# Patient Record
Sex: MALE | Race: WHITE | NOT HISPANIC OR LATINO
[De-identification: names, ages, dates, MRNs, and addresses within clinical notes are randomized per-mention and may not be internally consistent; named-entity substitution may affect disease eponyms.]

---

## 2017-03-08 ENCOUNTER — APPOINTMENT (OUTPATIENT)
Dept: PULMONOLOGY | Facility: CLINIC | Age: 72
End: 2017-03-08

## 2017-03-08 VITALS
DIASTOLIC BLOOD PRESSURE: 64 MMHG | WEIGHT: 238 LBS | BODY MASS INDEX: 35.15 KG/M2 | HEART RATE: 68 BPM | SYSTOLIC BLOOD PRESSURE: 110 MMHG | OXYGEN SATURATION: 96 %

## 2017-03-08 DIAGNOSIS — F51.12 INSUFFICIENT SLEEP SYNDROME: ICD-10-CM

## 2017-04-11 ENCOUNTER — APPOINTMENT (OUTPATIENT)
Dept: PULMONOLOGY | Facility: CLINIC | Age: 72
End: 2017-04-11

## 2018-02-08 ENCOUNTER — MOBILE ON CALL (OUTPATIENT)
Age: 73
End: 2018-02-08

## 2018-02-16 ENCOUNTER — RX RENEWAL (OUTPATIENT)
Age: 73
End: 2018-02-16

## 2018-03-16 ENCOUNTER — APPOINTMENT (OUTPATIENT)
Dept: CARDIOLOGY | Facility: CLINIC | Age: 73
End: 2018-03-16

## 2018-04-02 ENCOUNTER — APPOINTMENT (OUTPATIENT)
Dept: CARDIOLOGY | Facility: CLINIC | Age: 73
End: 2018-04-02

## 2018-04-16 ENCOUNTER — APPOINTMENT (OUTPATIENT)
Dept: CARDIOLOGY | Facility: CLINIC | Age: 73
End: 2018-04-16

## 2018-04-18 ENCOUNTER — APPOINTMENT (OUTPATIENT)
Dept: CARDIOLOGY | Facility: CLINIC | Age: 73
End: 2018-04-18

## 2018-05-15 ENCOUNTER — RECORD ABSTRACTING (OUTPATIENT)
Age: 73
End: 2018-05-15

## 2018-05-15 DIAGNOSIS — K57.90 DIVERTICULOSIS OF INTESTINE, PART UNSPECIFIED, W/OUT PERFORATION OR ABSCESS W/OUT BLEEDING: ICD-10-CM

## 2018-05-15 DIAGNOSIS — G45.9 TRANSIENT CEREBRAL ISCHEMIC ATTACK, UNSPECIFIED: ICD-10-CM

## 2018-05-16 ENCOUNTER — APPOINTMENT (OUTPATIENT)
Dept: CARDIOLOGY | Facility: CLINIC | Age: 73
End: 2018-05-16
Payer: MEDICARE

## 2018-05-16 VITALS
SYSTOLIC BLOOD PRESSURE: 102 MMHG | HEART RATE: 67 BPM | BODY MASS INDEX: 30.48 KG/M2 | WEIGHT: 225 LBS | DIASTOLIC BLOOD PRESSURE: 70 MMHG | HEIGHT: 72 IN | RESPIRATION RATE: 16 BRPM

## 2018-05-16 PROCEDURE — 99214 OFFICE O/P EST MOD 30 MIN: CPT

## 2018-05-16 PROCEDURE — 93000 ELECTROCARDIOGRAM COMPLETE: CPT

## 2018-05-23 ENCOUNTER — RX RENEWAL (OUTPATIENT)
Age: 73
End: 2018-05-23

## 2018-05-25 ENCOUNTER — RX RENEWAL (OUTPATIENT)
Age: 73
End: 2018-05-25

## 2018-08-20 PROBLEM — G45.9 TRANSIENT ISCHEMIC ATTACK: Status: ACTIVE | Noted: 2018-05-15

## 2018-09-10 ENCOUNTER — RX RENEWAL (OUTPATIENT)
Age: 73
End: 2018-09-10

## 2018-11-12 ENCOUNTER — APPOINTMENT (OUTPATIENT)
Dept: CARDIOLOGY | Facility: CLINIC | Age: 73
End: 2018-11-12
Payer: MEDICARE

## 2018-11-12 VITALS
OXYGEN SATURATION: 95 % | WEIGHT: 235 LBS | HEART RATE: 67 BPM | RESPIRATION RATE: 15 BRPM | SYSTOLIC BLOOD PRESSURE: 99 MMHG | DIASTOLIC BLOOD PRESSURE: 64 MMHG | HEIGHT: 72 IN | BODY MASS INDEX: 31.83 KG/M2

## 2018-11-12 PROCEDURE — 93000 ELECTROCARDIOGRAM COMPLETE: CPT

## 2018-11-12 PROCEDURE — 99214 OFFICE O/P EST MOD 30 MIN: CPT

## 2018-11-12 NOTE — ASSESSMENT
[FreeTextEntry1] : ECG: SR, lefwtard axis\par ECHO 2017: Sigmoid septum, EF 50-55%, normal RV, normal LA/RA, mild AI, mild MAC\par PHARMACOLOGIC NUCLEAR STRESS TEST 2017: Negative for ischemia, EF 64%\par CAROTID DUPLEX 2016: Minimal plaque bilaterally, antegrade flow in vertebral arteries\par CARDIAC CATH 2013: 50% LAD stenosis, CALCIUM SCORE 100 in 2013\par AORTIC DUPLEX 2013: Negative for aneurysm\par \par

## 2018-11-12 NOTE — DISCUSSION/SUMMARY
[FreeTextEntry1] : Patient is a 71yo M with PAF, TIA, non-obstructive CAD, IRAM, AI. Patient has been doing well without any chest pain or shortness of breath. \par \par 1. Continue medical management of CAD\par 2. Continue anticoagulation for atrial fibrillation thromboembolic prophylaxis\par 3. Continue metoprolol at current dose, may need to cut back if BP drops lower and symptomatic\par 4. Recommend aggressive diet and lifestyle modifications \par 5. Recommend 30 minutes aerobic activity 4 to 5 days per week as tolerated\par 6. Follow up 6 months\par 7. Echo and nuclear stress test in 6 months for surveillance of CAD\par 8. Carotid in 6 months for surveillance as well\par 9. Repeat lipids with PMD in coming weeks

## 2018-11-12 NOTE — PHYSICAL EXAM
[General Appearance - Well Developed] : well developed [Normal Appearance] : normal appearance [Well Groomed] : well groomed [General Appearance - Well Nourished] : well nourished [No Deformities] : no deformities [General Appearance - In No Acute Distress] : no acute distress [Normal Conjunctiva] : the conjunctiva exhibited no abnormalities [Eyelids - No Xanthelasma] : the eyelids demonstrated no xanthelasmas [Normal Oral Mucosa] : normal oral mucosa [No Oral Pallor] : no oral pallor [No Oral Cyanosis] : no oral cyanosis [Normal Jugular Venous A Waves Present] : normal jugular venous A waves present [Normal Jugular Venous V Waves Present] : normal jugular venous V waves present [No Jugular Venous Diop A Waves] : no jugular venous diop A waves [Respiration, Rhythm And Depth] : normal respiratory rhythm and effort [Exaggerated Use Of Accessory Muscles For Inspiration] : no accessory muscle use [Auscultation Breath Sounds / Voice Sounds] : lungs were clear to auscultation bilaterally [Heart Rate And Rhythm] : heart rate and rhythm were normal [Heart Sounds] : normal S1 and S2 [Murmurs] : no murmurs present [Abdomen Soft] : soft [Abdomen Tenderness] : non-tender [Abdomen Mass (___ Cm)] : no abdominal mass palpated [Abnormal Walk] : normal gait [Nail Clubbing] : no clubbing of the fingernails [Cyanosis, Localized] : no localized cyanosis [Petechial Hemorrhages (___cm)] : no petechial hemorrhages [Skin Color & Pigmentation] : normal skin color and pigmentation [] : no rash [No Venous Stasis] : no venous stasis [Skin Lesions] : no skin lesions [No Skin Ulcers] : no skin ulcer [No Xanthoma] : no  xanthoma was observed [Oriented To Time, Place, And Person] : oriented to person, place, and time [Affect] : the affect was normal [Mood] : the mood was normal [No Anxiety] : not feeling anxious [FreeTextEntry1] : obese

## 2018-11-12 NOTE — HISTORY OF PRESENT ILLNESS
[FreeTextEntry1] : Patient is a 73yo M with PAF, TIA, non-obstructive CAD, IRAM, AI here for cardiac follow up. Doing well without exertional CP/SOB. No palps/dizziness/syncope. No new complaints since prior visit. A bit tired lately and took a few weeks to recover from bronchitis. Goes to bed late and up early which has been chronic for him. \par \par ROS: GI and  negative

## 2019-02-11 ENCOUNTER — RX RENEWAL (OUTPATIENT)
Age: 74
End: 2019-02-11

## 2019-03-11 ENCOUNTER — RX RENEWAL (OUTPATIENT)
Age: 74
End: 2019-03-11

## 2019-04-12 ENCOUNTER — APPOINTMENT (OUTPATIENT)
Dept: CARDIOLOGY | Facility: CLINIC | Age: 74
End: 2019-04-12
Payer: MEDICARE

## 2019-04-12 PROCEDURE — 93880 EXTRACRANIAL BILAT STUDY: CPT

## 2019-04-12 PROCEDURE — 93306 TTE W/DOPPLER COMPLETE: CPT

## 2019-04-16 ENCOUNTER — APPOINTMENT (OUTPATIENT)
Dept: CARDIOLOGY | Facility: CLINIC | Age: 74
End: 2019-04-16
Payer: MEDICARE

## 2019-04-16 PROCEDURE — A9500: CPT

## 2019-04-16 PROCEDURE — 78452 HT MUSCLE IMAGE SPECT MULT: CPT

## 2019-04-16 PROCEDURE — 93015 CV STRESS TEST SUPVJ I&R: CPT

## 2019-04-16 RX ORDER — REGADENOSON 0.08 MG/ML
0.4 INJECTION, SOLUTION INTRAVENOUS
Qty: 1 | Refills: 0 | Status: COMPLETED | OUTPATIENT
Start: 2019-04-16

## 2019-04-16 RX ADMIN — REGADENOSON 5 MG/5ML: 0.08 INJECTION, SOLUTION INTRAVENOUS at 00:00

## 2019-04-22 ENCOUNTER — APPOINTMENT (OUTPATIENT)
Dept: CARDIOLOGY | Facility: CLINIC | Age: 74
End: 2019-04-22
Payer: MEDICARE

## 2019-04-22 PROCEDURE — ZZZZZ: CPT

## 2019-05-01 ENCOUNTER — NON-APPOINTMENT (OUTPATIENT)
Age: 74
End: 2019-05-01

## 2019-05-01 ENCOUNTER — APPOINTMENT (OUTPATIENT)
Dept: CARDIOLOGY | Facility: CLINIC | Age: 74
End: 2019-05-01
Payer: MEDICARE

## 2019-05-01 VITALS
BODY MASS INDEX: 31.15 KG/M2 | WEIGHT: 230 LBS | OXYGEN SATURATION: 94 % | SYSTOLIC BLOOD PRESSURE: 114 MMHG | RESPIRATION RATE: 14 BRPM | DIASTOLIC BLOOD PRESSURE: 70 MMHG | HEART RATE: 65 BPM | HEIGHT: 72 IN

## 2019-05-01 PROCEDURE — 93000 ELECTROCARDIOGRAM COMPLETE: CPT

## 2019-05-01 PROCEDURE — 99214 OFFICE O/P EST MOD 30 MIN: CPT

## 2019-05-01 NOTE — HISTORY OF PRESENT ILLNESS
[FreeTextEntry1] : Patient is a 71yo M with PAF, TIA, non-obstructive CAD, IRAM, AI here for cardiac follow up. Doing well without exertional CP/SOB. No palps/dizziness/syncope. No new complaints since prior visit. A bit tired lately. Otherwise no new complaints. Underwent cardiac testing after last visit. \par \par ROS: GI and  negative

## 2019-05-01 NOTE — PHYSICAL EXAM
[General Appearance - Well Developed] : well developed [Normal Appearance] : normal appearance [Well Groomed] : well groomed [General Appearance - Well Nourished] : well nourished [No Deformities] : no deformities [General Appearance - In No Acute Distress] : no acute distress [Normal Conjunctiva] : the conjunctiva exhibited no abnormalities [Eyelids - No Xanthelasma] : the eyelids demonstrated no xanthelasmas [Normal Oral Mucosa] : normal oral mucosa [No Oral Pallor] : no oral pallor [No Oral Cyanosis] : no oral cyanosis [Normal Jugular Venous A Waves Present] : normal jugular venous A waves present [Normal Jugular Venous V Waves Present] : normal jugular venous V waves present [No Jugular Venous Diop A Waves] : no jugular venous diop A waves [Respiration, Rhythm And Depth] : normal respiratory rhythm and effort [Exaggerated Use Of Accessory Muscles For Inspiration] : no accessory muscle use [Auscultation Breath Sounds / Voice Sounds] : lungs were clear to auscultation bilaterally [Heart Rate And Rhythm] : heart rate and rhythm were normal [Heart Sounds] : normal S1 and S2 [Murmurs] : no murmurs present [Abdomen Soft] : soft [Abdomen Tenderness] : non-tender [Abdomen Mass (___ Cm)] : no abdominal mass palpated [Nail Clubbing] : no clubbing of the fingernails [Abnormal Walk] : normal gait [Petechial Hemorrhages (___cm)] : no petechial hemorrhages [Cyanosis, Localized] : no localized cyanosis [Skin Color & Pigmentation] : normal skin color and pigmentation [] : no rash [No Venous Stasis] : no venous stasis [Skin Lesions] : no skin lesions [No Xanthoma] : no  xanthoma was observed [No Skin Ulcers] : no skin ulcer [Oriented To Time, Place, And Person] : oriented to person, place, and time [Affect] : the affect was normal [No Anxiety] : not feeling anxious [Mood] : the mood was normal [FreeTextEntry1] : obese

## 2019-05-01 NOTE — DISCUSSION/SUMMARY
[FreeTextEntry1] : Patient is a 73yo M with PAF, TIA, non-obstructive CAD, IRAM, AI. Patient has been doing well without any chest pain or shortness of breath. Strss test without ischemic, Echo shows normal biventricular function and no clinically significant valvular abnormalities. Mild AI needs surveillance only and unlikely to become significant. No significant carotid disease either. \par \par 1. Continue medical management of CAD, on statin. No need to add ASA as on Eliquis. Lipids at goal \par 2. Continue anticoagulation for atrial fibrillation thromboembolic prophylaxis\par 3. Continue current antihypertensives, blood pressure is well controlled \par 4. Recommend aggressive diet and lifestyle modifications \par 5. Recommend 30 minutes moderate intensity aerobic activity 5 days per week as tolerated \par 6. Follow up 6 months\par 7. ADvised to quit cigars, only smokes occasionally

## 2019-05-01 NOTE — ASSESSMENT
[FreeTextEntry1] : ECG: SR, leftward axis, 1st degree AV delay\par \par  HDL 51 TG 70 LDL 59 (12/2018)\par \par CAROTID 4/2019:\par 1. All arteries were clearly visualized.\par 2. No hemodynamically significant stenosis is noted in the left ICA.\par 3. No hemodynamically significant stenosis is noted in the right ICA.\par 4. There is antegrade flow in the right and left vertebral arteries.\par \par ECHO 4/2019:\par 1. Sigmoid septum without evidence of obstruction.\par 2. Normal left ventricular size and wall thickness, with normal systolic and diastolic function.\par 3. Left ventricular ejection fraction, by visual estimation, is 55 to 60%.\par 4. Normal right ventricular size and systolic function.\par 5. The left atrium is normal in size and structure.\par 6. The right atrium is normal in size and structure.\par 7. Mild thickening and calcification of the anterior and posterior mitral valve leaflets.\par 8. Mild to moderate posterior mitral annular calcification.\par 9. Mild aortic valve sclerosis without stenosis.\par 10. Mild aortic regurgitation.\par \par PHARM NUCLEAR STRESS TEST 4/2019:\par 1. Normal Sestamibi myocardial perfusion SPECT imaging.\par 2. Left ventricular function was normal with an EF of 61%.\par 3. The EKG was negative for ischemia.\par 4. The patient developed no dysrhythmias during stress.\par 5. The blood pressure response was normal.\par \par \par CARDIAC CATH 2013: 50% LAD stenosis, CALCIUM SCORE 100 in 2013\par AORTIC DUPLEX 2013: Negative for aneurysm\par \par

## 2019-05-03 RX ORDER — KIT FOR THE PREPARATION OF TECHNETIUM TC99M SESTAMIBI 1 MG/5ML
INJECTION, POWDER, LYOPHILIZED, FOR SOLUTION PARENTERAL
Refills: 0 | Status: COMPLETED | OUTPATIENT
Start: 2019-05-03

## 2019-05-03 RX ADMIN — KIT FOR THE PREPARATION OF TECHNETIUM TC99M SESTAMIBI 0: 1 INJECTION, POWDER, LYOPHILIZED, FOR SOLUTION PARENTERAL at 00:00

## 2019-05-20 ENCOUNTER — RX RENEWAL (OUTPATIENT)
Age: 74
End: 2019-05-20

## 2019-09-09 ENCOUNTER — RX RENEWAL (OUTPATIENT)
Age: 74
End: 2019-09-09

## 2019-10-11 ENCOUNTER — APPOINTMENT (OUTPATIENT)
Dept: CARDIOLOGY | Facility: CLINIC | Age: 74
End: 2019-10-11

## 2019-11-22 ENCOUNTER — RX RENEWAL (OUTPATIENT)
Age: 74
End: 2019-11-22

## 2019-12-05 ENCOUNTER — APPOINTMENT (OUTPATIENT)
Dept: CARDIOLOGY | Facility: CLINIC | Age: 74
End: 2019-12-05

## 2019-12-19 ENCOUNTER — APPOINTMENT (OUTPATIENT)
Dept: CARDIOLOGY | Facility: CLINIC | Age: 74
End: 2019-12-19
Payer: MEDICARE

## 2019-12-19 ENCOUNTER — NON-APPOINTMENT (OUTPATIENT)
Age: 74
End: 2019-12-19

## 2019-12-19 VITALS
WEIGHT: 226 LBS | BODY MASS INDEX: 30.61 KG/M2 | DIASTOLIC BLOOD PRESSURE: 72 MMHG | HEART RATE: 64 BPM | OXYGEN SATURATION: 99 % | HEIGHT: 72 IN | SYSTOLIC BLOOD PRESSURE: 108 MMHG

## 2019-12-19 PROCEDURE — 99214 OFFICE O/P EST MOD 30 MIN: CPT

## 2019-12-19 PROCEDURE — 93000 ELECTROCARDIOGRAM COMPLETE: CPT

## 2019-12-19 NOTE — HISTORY OF PRESENT ILLNESS
[FreeTextEntry1] : Patient is a 75yo M with PAF, TIA, non-obstructive CAD, IRAM, AI here for cardiac follow up. Doing well without exertional CP/SOB. No palps/dizziness/syncope. No new complaints since prior visit. Tolerating meds well. RAre fleeting CP lasts seconds. No associated symptoms with it. Tries to walk as much as possible and bowls. Chronic neck/back pain. \par \par ROS: GI and  negative

## 2019-12-19 NOTE — DISCUSSION/SUMMARY
[FreeTextEntry1] : Patient is a 75yo M with PAF, TIA, non-obstructive CAD, IRAM, AI. Patient has been doing well without any chest pain or shortness of breath. Stress test earlier this year without ischemic, Echo showed normal biventricular function and no clinically significant valvular abnormalities. Mild AI needs surveillance only and unlikely to become significant. No significant carotid disease either. Overall stable from CV standpoint\par ECG unchanged as is exam today. \par \par 1. Continue medical management of CAD, on statin. No need to add ASA as on Eliquis. Lipids at goal \par 2. Continue anticoagulation for atrial fibrillation thromboembolic prophylaxis\par 3. Continue current antihypertensives, blood pressure is well controlled \par 4. Recommend aggressive diet and lifestyle modifications \par 5. Recommend 30 minutes moderate intensity aerobic activity 5 days per week as tolerated \par 6. Follow up 1 year\par 7. ADvised to quit cigars, only smokes occasionally \par 8. Follow up PMD for back/neck pain and BW

## 2019-12-19 NOTE — ASSESSMENT
[FreeTextEntry1] : ECG: SR, LAFB \par \par  HDL 51 TG 70 LDL 59 (12/2018)\par \par CAROTID 4/2019:\par 1. All arteries were clearly visualized.\par 2. No hemodynamically significant stenosis is noted in the left ICA.\par 3. No hemodynamically significant stenosis is noted in the right ICA.\par 4. There is antegrade flow in the right and left vertebral arteries.\par \par ECHO 4/2019:\par 1. Sigmoid septum without evidence of obstruction.\par 2. Normal left ventricular size and wall thickness, with normal systolic and diastolic function.\par 3. Left ventricular ejection fraction, by visual estimation, is 55 to 60%.\par 4. Normal RV/LA/RA \par 5. Mild to moderate posterior mitral annular calcification.\par 6. Mild aortic regurgitation.\par \par PHARM NUCLEAR STRESS TEST 4/2019:\par 1. Normal Sestamibi myocardial perfusion SPECT imaging.\par 2. Left ventricular function was normal with an EF of 61%.\par 3. The EKG was negative for ischemia.\par 4. The patient developed no dysrhythmias during stress.\par 5. The blood pressure response was normal.\par \par \par CARDIAC CATH 2013: 50% LAD stenosis, CALCIUM SCORE 100 in 2013\par AORTIC DUPLEX 2013: Negative for aneurysm\par \par

## 2019-12-19 NOTE — PHYSICAL EXAM
[General Appearance - Well Developed] : well developed [General Appearance - In No Acute Distress] : no acute distress [General Appearance - Well Nourished] : well nourished [Normal Oral Mucosa] : normal oral mucosa [Normal Conjunctiva] : the conjunctiva exhibited no abnormalities [Normal Jugular Venous V Waves Present] : normal jugular venous V waves present [Respiration, Rhythm And Depth] : normal respiratory rhythm and effort [Heart Sounds] : normal S1 and S2 [Auscultation Breath Sounds / Voice Sounds] : lungs were clear to auscultation bilaterally [Heart Rate And Rhythm] : heart rate and rhythm were normal [Murmurs] : no murmurs present [Abdomen Soft] : soft [Abdomen Tenderness] : non-tender [] : no hepato-splenomegaly [Abdomen Mass (___ Cm)] : no abdominal mass palpated [Abnormal Walk] : normal gait [Nail Clubbing] : no clubbing of the fingernails [Cyanosis, Localized] : no localized cyanosis [Skin Color & Pigmentation] : normal skin color and pigmentation [Oriented To Time, Place, And Person] : oriented to person, place, and time [Affect] : the affect was normal [FreeTextEntry1] : no edema

## 2020-05-12 ENCOUNTER — RX RENEWAL (OUTPATIENT)
Age: 75
End: 2020-05-12

## 2020-05-20 ENCOUNTER — RX RENEWAL (OUTPATIENT)
Age: 75
End: 2020-05-20

## 2020-08-18 ENCOUNTER — NON-APPOINTMENT (OUTPATIENT)
Age: 75
End: 2020-08-18

## 2020-08-18 ENCOUNTER — APPOINTMENT (OUTPATIENT)
Dept: CARDIOLOGY | Facility: CLINIC | Age: 75
End: 2020-08-18
Payer: MEDICARE

## 2020-08-18 VITALS
HEART RATE: 78 BPM | DIASTOLIC BLOOD PRESSURE: 72 MMHG | OXYGEN SATURATION: 97 % | BODY MASS INDEX: 30.2 KG/M2 | TEMPERATURE: 97.3 F | SYSTOLIC BLOOD PRESSURE: 112 MMHG | WEIGHT: 223 LBS | RESPIRATION RATE: 16 BRPM | HEIGHT: 72 IN

## 2020-08-18 PROCEDURE — 93000 ELECTROCARDIOGRAM COMPLETE: CPT

## 2020-08-18 PROCEDURE — 99214 OFFICE O/P EST MOD 30 MIN: CPT

## 2020-08-18 NOTE — DISCUSSION/SUMMARY
[FreeTextEntry1] : Patient is a 74yo M with PAF, TIA, non-obstructive CAD, IRAM, AI. Patient has been doing well without any chest pain or shortness of breath. Stress test 2019 without ischemic, Echo showed normal biventricular function and no clinically significant valvular abnormalities. Mild AI needs surveillance only and unlikely to become significant. No significant carotid disease either. Overall stable from CV standpoint\par ECG unchanged as is exam today. \par \par 1. Continue medical management of CAD, on statin. No need to add ASA as on Eliquis. Lipids at goal \par 2. Continue anticoagulation for atrial fibrillation thromboembolic prophylaxis\par 3. Continue current antihypertensives, blood pressure is well controlled \par 4. Recommend aggressive diet and lifestyle modifications \par 5. Recommend 30 minutes moderate intensity aerobic activity 5 days per week as tolerated \par 6. Patient is at low cardiovascular risk for low risk procedures (EGD/colonoscopy). Hold Eliquis 48 hours prior and restart post-procedurally. \par 7. Follow up 6 months

## 2020-08-18 NOTE — PHYSICAL EXAM
[General Appearance - Well Developed] : well developed [General Appearance - Well Nourished] : well nourished [General Appearance - In No Acute Distress] : no acute distress [Normal Conjunctiva] : the conjunctiva exhibited no abnormalities [Normal Oral Mucosa] : normal oral mucosa [Normal Jugular Venous V Waves Present] : normal jugular venous V waves present [Auscultation Breath Sounds / Voice Sounds] : lungs were clear to auscultation bilaterally [Respiration, Rhythm And Depth] : normal respiratory rhythm and effort [Heart Sounds] : normal S1 and S2 [Heart Rate And Rhythm] : heart rate and rhythm were normal [Murmurs] : no murmurs present [Abdomen Soft] : soft [] : no hepato-splenomegaly [Abdomen Mass (___ Cm)] : no abdominal mass palpated [Abdomen Tenderness] : non-tender [Abnormal Walk] : normal gait [Cyanosis, Localized] : no localized cyanosis [Nail Clubbing] : no clubbing of the fingernails [Skin Color & Pigmentation] : normal skin color and pigmentation [Oriented To Time, Place, And Person] : oriented to person, place, and time [Affect] : the affect was normal [FreeTextEntry1] : no edema

## 2020-08-18 NOTE — ASSESSMENT
[FreeTextEntry1] : ECG: SR, LAFB , APCs\par \par  HDL 72 LDL 69 TG 60 (6/2020) \par  HDL 51 LDL 59 TG 70 (12/2018)\par \par CAROTID 4/2019:\par 1. All arteries were clearly visualized.\par 2. No hemodynamically significant stenosis is noted in the left ICA.\par 3. No hemodynamically significant stenosis is noted in the right ICA.\par 4. There is antegrade flow in the right and left vertebral arteries.\par \par ECHO 4/2019:\par 1. Sigmoid septum without evidence of obstruction.\par 2. Normal left ventricular size and wall thickness, with normal systolic and diastolic function.\par 3. Left ventricular ejection fraction, by visual estimation, is 55 to 60%.\par 4. Normal RV/LA/RA \par 5. Mild to moderate posterior mitral annular calcification.\par 6. Mild aortic regurgitation.\par \par PHARM NUCLEAR STRESS TEST 4/2019:\par 1. Normal Sestamibi myocardial perfusion SPECT imaging.\par 2. Left ventricular function was normal with an EF of 61%.\par 3. The EKG was negative for ischemia.\par 4. The patient developed no dysrhythmias during stress.\par 5. The blood pressure response was normal.\par \par \par CARDIAC CATH 2013: 50% LAD stenosis, CALCIUM SCORE 100 in 2013\par AORTIC DUPLEX 2013: Negative for aneurysm\par \par

## 2020-08-18 NOTE — HISTORY OF PRESENT ILLNESS
[FreeTextEntry1] : Patient is a 74yo M with PAF, TIA, non-obstructive CAD, IRAM, AI here for cardiac follow up. Doing well without exertional CP/SOB. No palps/dizziness/syncope. No new complaints since prior visit. Tolerating meds well. Been very careful during pandemic, not going out much. Walks aroundhouse and up and down stairs without symptoms. HAs intermittent sharp left sided chest pains that resolves quickly. HAs had ove years in past intermittently. NO change. In need of EGD/colonoscopy. STill with chronic neck pain. \par \par ROS: GI and  negative

## 2020-11-09 ENCOUNTER — RX RENEWAL (OUTPATIENT)
Age: 75
End: 2020-11-09

## 2021-04-13 ENCOUNTER — NON-APPOINTMENT (OUTPATIENT)
Age: 76
End: 2021-04-13

## 2021-04-13 ENCOUNTER — APPOINTMENT (OUTPATIENT)
Dept: CARDIOLOGY | Facility: CLINIC | Age: 76
End: 2021-04-13
Payer: MEDICARE

## 2021-04-13 VITALS
DIASTOLIC BLOOD PRESSURE: 73 MMHG | SYSTOLIC BLOOD PRESSURE: 114 MMHG | BODY MASS INDEX: 30.34 KG/M2 | HEART RATE: 73 BPM | WEIGHT: 224 LBS | RESPIRATION RATE: 16 BRPM | HEIGHT: 72 IN | OXYGEN SATURATION: 97 %

## 2021-04-13 PROCEDURE — 99214 OFFICE O/P EST MOD 30 MIN: CPT

## 2021-04-13 PROCEDURE — 93000 ELECTROCARDIOGRAM COMPLETE: CPT

## 2021-04-13 NOTE — DISCUSSION/SUMMARY
[FreeTextEntry1] : Patient is a 74yo M with PAF, TIA, non-obstructive CAD, IRAM, AI.\par -Stress test 2019 without ischemic, Echo showed normal biventricular function. Mild AI needs surveillance -Minimal plaque on carotid duplex in 2019\par -ECG with 1st degree delay, otherwise unchanged. No indication for further eval at this time\par \par 1. Continue medical management of CAD, on statin. No need to add ASA as on Eliquis.Lipids at goal 6/2020\par 2. Continue anticoagulation for atrial fibrillation thromboembolic prophylaxis\par 3. Continue current antihypertensives, blood pressure is well controlled\par 4. Recommend aggressive diet and lifestyle modifications , counselled on weight loss \par 5. Follow up 6 months

## 2021-04-13 NOTE — HISTORY OF PRESENT ILLNESS
[FreeTextEntry1] : Patient is a 74yo M with PAF, TIA, non-obstructive CAD, IRAM, AI here for cardiac follow up. Doing well without exertional CP/SOB. No palps/dizziness/syncope. REmains sedentary since pandemic. Got both doses COVID vaccine in 2/2021. RArely will feel a bit tired. Doesnt sleep much which is chronic. No new complaints.. Able to go up stairs without symptoms. \par \par ROS: GI and  negative

## 2021-04-13 NOTE — PHYSICAL EXAM
[General Appearance - Well Developed] : well developed [General Appearance - Well Nourished] : well nourished [General Appearance - In No Acute Distress] : no acute distress [Normal Conjunctiva] : the conjunctiva exhibited no abnormalities [Normal Oral Mucosa] : normal oral mucosa [Normal Jugular Venous V Waves Present] : normal jugular venous V waves present [Respiration, Rhythm And Depth] : normal respiratory rhythm and effort [Auscultation Breath Sounds / Voice Sounds] : lungs were clear to auscultation bilaterally [Heart Rate And Rhythm] : heart rate and rhythm were normal [Heart Sounds] : normal S1 and S2 [Murmurs] : no murmurs present [Abdomen Soft] : soft [Abdomen Tenderness] : non-tender [] : no hepato-splenomegaly [Abdomen Mass (___ Cm)] : no abdominal mass palpated [Abnormal Walk] : normal gait [Nail Clubbing] : no clubbing of the fingernails [Cyanosis, Localized] : no localized cyanosis [Skin Color & Pigmentation] : normal skin color and pigmentation [Oriented To Time, Place, And Person] : oriented to person, place, and time [Affect] : the affect was normal [FreeTextEntry1] : no edema

## 2021-04-13 NOTE — ASSESSMENT
[FreeTextEntry1] : ECG: SR, 1st degree AV delay, low voltage\par \par  HDL 72 LDL 69 TG 60 (6/2020) \par  HDL 51 LDL 59 TG 70 (12/2018)\par \par CAROTID 4/2019:\par 1. All arteries were clearly visualized.\par 2. No hemodynamically significant stenosis is noted in the left ICA.\par 3. No hemodynamically significant stenosis is noted in the right ICA.\par 4. There is antegrade flow in the right and left vertebral arteries.\par \par ECHO 4/2019:\par 1. Sigmoid septum without evidence of obstruction.\par 2. Normal left ventricular size and wall thickness, with normal systolic and diastolic function.\par 3. Left ventricular ejection fraction, by visual estimation, is 55 to 60%.\par 4. Normal RV/LA/RA \par 5. Mild to moderate posterior mitral annular calcification.\par 6. Mild aortic regurgitation.\par \par PHARM NUCLEAR STRESS TEST 4/2019:\par 1. Normal Sestamibi myocardial perfusion SPECT imaging.\par 2. Left ventricular function was normal with an EF of 61%.\par 3. The EKG was negative for ischemia.\par 4. The patient developed no dysrhythmias during stress.\par 5. The blood pressure response was normal.\par \par \par CARDIAC CATH 2013: 50% LAD stenosis, CALCIUM SCORE 100 in 2013\par AORTIC DUPLEX 2013: Negative for aneurysm\par \par

## 2021-06-16 ENCOUNTER — RX RENEWAL (OUTPATIENT)
Age: 76
End: 2021-06-16

## 2021-10-12 ENCOUNTER — APPOINTMENT (OUTPATIENT)
Dept: CARDIOLOGY | Facility: CLINIC | Age: 76
End: 2021-10-12

## 2021-10-13 ENCOUNTER — NON-APPOINTMENT (OUTPATIENT)
Age: 76
End: 2021-10-13

## 2021-10-13 ENCOUNTER — APPOINTMENT (OUTPATIENT)
Dept: CARDIOLOGY | Facility: CLINIC | Age: 76
End: 2021-10-13
Payer: MEDICARE

## 2021-10-13 VITALS
SYSTOLIC BLOOD PRESSURE: 92 MMHG | DIASTOLIC BLOOD PRESSURE: 63 MMHG | BODY MASS INDEX: 28.85 KG/M2 | HEART RATE: 74 BPM | RESPIRATION RATE: 16 BRPM | HEIGHT: 72 IN | WEIGHT: 213 LBS

## 2021-10-13 PROCEDURE — 99214 OFFICE O/P EST MOD 30 MIN: CPT

## 2021-10-13 PROCEDURE — 93000 ELECTROCARDIOGRAM COMPLETE: CPT

## 2021-10-13 RX ORDER — AMOXICILLIN 500 MG/1
500 CAPSULE ORAL
Qty: 4 | Refills: 0 | Status: COMPLETED | COMMUNITY
Start: 2021-07-22

## 2021-10-13 RX ORDER — DOXAZOSIN 8 MG/1
8 TABLET ORAL
Qty: 90 | Refills: 0 | Status: COMPLETED | COMMUNITY
Start: 2021-07-01

## 2021-10-13 RX ORDER — BUPROPION HYDROCHLORIDE 150 MG/1
150 TABLET, EXTENDED RELEASE ORAL
Qty: 90 | Refills: 0 | Status: COMPLETED | COMMUNITY
Start: 2021-08-09

## 2021-10-13 NOTE — DISCUSSION/SUMMARY
[FreeTextEntry1] : Patient is a 75yo M with PAF, TIA, non-obstructive CAD, IRAM, AI here for follow up \par -Stress test 2019 without ischemic, Echo showed normal biventricular function. Mild AI needs surveillance -Minimal plaque on carotid duplex in 2019\par -ECG shows patient now in persistent AF, no clear symptoms. BP a bit low but asx. Will arrange holter and echo, as long as rate controller/no new symptoms and no change in echo then follow up 6 mnths with rate control strategy\par \par 1. Continue medical management of CAD, on statin. No need to add ASA as on Eliquis.Lipids at goal 6/2020\par 2. Continue anticoagulation for atrial fibrillation thromboembolic prophylaxis and rate control strategy\par 3. ECho and holter, call with results as long as stable findings \par 4. Recommend aggressive diet and lifestyle modifications , counselled on weight loss \par 5. Follow up 6 months

## 2021-10-13 NOTE — HISTORY OF PRESENT ILLNESS
[FreeTextEntry1] : Patient is a 77yo M with PAF, TIA, non-obstructive CAD, IRAM, AI here for cardiac follow up. Doing well without CP. No palps/dizziness/syncope. REmains sedentary since pandemic. Notes some tingling in feet in mornings, also some phlegm at times. Gets tired. Occasionally SOB but not consistently. Patient denies PND/orthopnea/edema/palpitations/syncope/claudication \par \par ROS: GI and  negative

## 2021-10-13 NOTE — ASSESSMENT
[FreeTextEntry1] : ECG: AF, LAFB, low voltage, NSST \par \par  HDL 72 LDL 69 TG 60 (6/2020) \par  HDL 51 LDL 59 TG 70 (12/2018)\par \par CAROTID 4/2019:\par 1. All arteries were clearly visualized.\par 2. No hemodynamically significant stenosis is noted in the left ICA.\par 3. No hemodynamically significant stenosis is noted in the right ICA.\par 4. There is antegrade flow in the right and left vertebral arteries.\par \par ECHO 4/2019:\par 1. Sigmoid septum without evidence of obstruction.\par 2. Normal left ventricular size and wall thickness, with normal systolic and diastolic function.\par 3. Left ventricular ejection fraction, by visual estimation, is 55 to 60%.\par 4. Normal RV/LA/RA \par 5. Mild to moderate posterior mitral annular calcification.\par 6. Mild aortic regurgitation.\par \par PHARM NUCLEAR STRESS TEST 4/2019:\par 1. Normal Sestamibi myocardial perfusion SPECT imaging.\par 2. Left ventricular function was normal with an EF of 61%.\par 3. The EKG was negative for ischemia.\par 4. The patient developed no dysrhythmias during stress.\par 5. The blood pressure response was normal.\par \par \par CARDIAC CATH 2013: 50% LAD stenosis, CALCIUM SCORE 100 in 2013\par AORTIC DUPLEX 2013: Negative for aneurysm\par \par

## 2021-10-13 NOTE — PHYSICAL EXAM
[General Appearance - Well Developed] : well developed [General Appearance - Well Nourished] : well nourished [General Appearance - In No Acute Distress] : no acute distress [Normal Conjunctiva] : the conjunctiva exhibited no abnormalities [Normal Oral Mucosa] : normal oral mucosa [Normal Jugular Venous V Waves Present] : normal jugular venous V waves present [Respiration, Rhythm And Depth] : normal respiratory rhythm and effort [Auscultation Breath Sounds / Voice Sounds] : lungs were clear to auscultation bilaterally [Heart Sounds] : normal S1 and S2 [Murmurs] : no murmurs present [Abdomen Soft] : soft [Abdomen Tenderness] : non-tender [] : no hepato-splenomegaly [Abdomen Mass (___ Cm)] : no abdominal mass palpated [Abnormal Walk] : normal gait [Nail Clubbing] : no clubbing of the fingernails [Cyanosis, Localized] : no localized cyanosis [Skin Color & Pigmentation] : normal skin color and pigmentation [Oriented To Time, Place, And Person] : oriented to person, place, and time [Affect] : the affect was normal [FreeTextEntry1] : no edema

## 2021-10-26 ENCOUNTER — APPOINTMENT (OUTPATIENT)
Dept: CARDIOLOGY | Facility: CLINIC | Age: 76
End: 2021-10-26
Payer: MEDICARE

## 2021-10-26 PROCEDURE — 93306 TTE W/DOPPLER COMPLETE: CPT

## 2021-10-26 RX ORDER — PERFLUTREN 6.52 MG/ML
6.52 INJECTION, SUSPENSION INTRAVENOUS
Qty: 2 | Refills: 0 | Status: COMPLETED | OUTPATIENT
Start: 2021-10-26

## 2021-10-26 RX ADMIN — PERFLUTREN MG/ML: 6.52 INJECTION, SUSPENSION INTRAVENOUS at 00:00

## 2021-11-03 ENCOUNTER — NON-APPOINTMENT (OUTPATIENT)
Age: 76
End: 2021-11-03

## 2021-11-04 ENCOUNTER — RX RENEWAL (OUTPATIENT)
Age: 76
End: 2021-11-04

## 2022-04-07 ENCOUNTER — APPOINTMENT (OUTPATIENT)
Dept: CARDIOLOGY | Facility: CLINIC | Age: 77
End: 2022-04-07
Payer: MEDICARE

## 2022-04-07 VITALS
DIASTOLIC BLOOD PRESSURE: 76 MMHG | RESPIRATION RATE: 16 BRPM | BODY MASS INDEX: 30.48 KG/M2 | WEIGHT: 225 LBS | HEART RATE: 57 BPM | HEIGHT: 72 IN | SYSTOLIC BLOOD PRESSURE: 126 MMHG

## 2022-04-07 DIAGNOSIS — R07.9 CHEST PAIN, UNSPECIFIED: ICD-10-CM

## 2022-04-07 DIAGNOSIS — R53.83 OTHER FATIGUE: ICD-10-CM

## 2022-04-07 DIAGNOSIS — R29.898 OTHER SYMPTOMS AND SIGNS INVOLVING THE MUSCULOSKELETAL SYSTEM: ICD-10-CM

## 2022-04-07 PROCEDURE — 99214 OFFICE O/P EST MOD 30 MIN: CPT

## 2022-04-07 PROCEDURE — 93000 ELECTROCARDIOGRAM COMPLETE: CPT

## 2022-04-07 NOTE — ASSESSMENT
[FreeTextEntry1] : ECG: SR, LAFB (done at PMD 4/2/2022)\par \par \par  HDL 72 LDL 69 TG 60 (6/2020) \par  HDL 51 LDL 59 TG 70 (12/2018)\par \par HOLTER 10/2021:\par 1 AF, rates average 70 () \par 2. < 3 second pauses\par \par ECHO 10/2021: \par 1. Sigmoid septum without obstruction, EF 55-60%\par 2. Normal RV/LA/RA \par 3. Trivial MR\par 4. Mild AI \par \par CAROTID 4/2019:\par 1. All arteries were clearly visualized.\par 2. No hemodynamically significant stenosis is noted in the left ICA.\par 3. No hemodynamically significant stenosis is noted in the right ICA.\par 4. There is antegrade flow in the right and left vertebral arteries.\par \par ECHO 4/2019:\par 1. Sigmoid septum without evidence of obstruction.\par 2. Normal left ventricular size and wall thickness, with normal systolic and diastolic function.\par 3. Left ventricular ejection fraction, by visual estimation, is 55 to 60%.\par 4. Normal RV/LA/RA \par 5. Mild to moderate posterior mitral annular calcification.\par 6. Mild aortic regurgitation.\par \par PHARM NUCLEAR STRESS TEST 4/2019:\par 1. Normal Sestamibi myocardial perfusion SPECT imaging.\par 2. Left ventricular function was normal with an EF of 61%.\par 3. The EKG was negative for ischemia.\par 4. The patient developed no dysrhythmias during stress.\par 5. The blood pressure response was normal.\par \par \par CARDIAC CATH 2013: 50% LAD stenosis, CALCIUM SCORE 100 in 2013\par AORTIC DUPLEX 2013: Negative for aneurysm\par \par

## 2022-04-07 NOTE — DISCUSSION/SUMMARY
[FreeTextEntry1] : Patient is a 77yo M with PAF, TIA, non-obstructive CAD, IRAM, AI here for follow up \par -Stress test 2019 without ischemic, \par -Echo 10/2021 showed normal biventricular function. Mild AI needs surveillance \par -Minimal plaque on carotid duplex in 2019\par -Was in AF last OV and holter but now SR\par -Recent atypical chest pain, fatigue and leg heaviness. Will plan of stess testing to ensure not ischemic and KAREN to eval PAD\par \par \par 1. Continue medical management of CAD, on statin. No need to add ASA as on Eliquis\par 2. Continue anticoagulation for atrial fibrillation thromboembolic prophylaxis and rate control strategy\par 3. Pharm nuclear stress test to evaluate CP/fatigue/CAD, unable to run on treadmill\par 4. KAREN/PVR to evaluate leg symptoms which may represent PAD\par 5. Continue current antihypertensives, blood pressure is well controlled \par 6. Follow up after testing \par \par

## 2022-04-07 NOTE — HISTORY OF PRESENT ILLNESS
[FreeTextEntry1] : Patient is a 75yo M with PAF, TIA, non-obstructive CAD, IRAM, AI here for cardiac follow up.. Gets tired, more so lately. Also pain in chest last week as well and saw PMD. Pain was in middle of chest and dull, came and went. Not exertional. Had some tingling in hands too. Seems to have resolved.  Occasionally SOB but not consistently. Patient denies PND/orthopnea/edema/palpitations/syncope. HAd episode of leg heaviness with walking. \par \par ROS: GI and  negative

## 2022-05-09 ENCOUNTER — APPOINTMENT (OUTPATIENT)
Dept: CARDIOLOGY | Facility: CLINIC | Age: 77
End: 2022-05-09
Payer: MEDICARE

## 2022-05-09 PROCEDURE — A9500: CPT

## 2022-05-09 PROCEDURE — 93015 CV STRESS TEST SUPVJ I&R: CPT

## 2022-05-09 PROCEDURE — 78452 HT MUSCLE IMAGE SPECT MULT: CPT

## 2022-05-10 ENCOUNTER — APPOINTMENT (OUTPATIENT)
Dept: CARDIOLOGY | Facility: CLINIC | Age: 77
End: 2022-05-10

## 2022-05-20 ENCOUNTER — APPOINTMENT (OUTPATIENT)
Dept: CARDIOLOGY | Facility: CLINIC | Age: 77
End: 2022-05-20
Payer: MEDICARE

## 2022-05-20 PROCEDURE — 93923 UPR/LXTR ART STDY 3+ LVLS: CPT

## 2022-06-02 ENCOUNTER — NON-APPOINTMENT (OUTPATIENT)
Age: 77
End: 2022-06-02

## 2022-06-02 NOTE — DISCUSSION/SUMMARY
[FreeTextEntry1] : Patient is a 77yo M with PAF, TIA, non-obstructive CAD, IRAM, AI here for follow up \par -Stress test 2019 without ischemic, Echo showed normal biventricular function. Mild AI needs surveillance -Minimal plaque on carotid duplex in 2019\par -ECG shows patient now in persistent AF, no clear symptoms. BP a bit low but asx. Will arrange holter and echo, as long as rate controller/no new symptoms and no change in echo then follow up 6 mnths with rate control strategy\par \par 1. Continue medical management of CAD, on statin. No need to add ASA as on Eliquis.Lipids at goal 6/2020\par 2. Continue anticoagulation for atrial fibrillation thromboembolic prophylaxis and rate control strategy\par 3. ECho and holter, call with results as long as stable findings \par 4. Recommend aggressive diet and lifestyle modifications , counselled on weight loss \par 5. Follow up 6 months

## 2022-06-15 ENCOUNTER — RX RENEWAL (OUTPATIENT)
Age: 77
End: 2022-06-15

## 2022-09-12 ENCOUNTER — APPOINTMENT (OUTPATIENT)
Dept: CARDIOLOGY | Facility: CLINIC | Age: 77
End: 2022-09-12

## 2022-09-12 ENCOUNTER — NON-APPOINTMENT (OUTPATIENT)
Age: 77
End: 2022-09-12

## 2022-09-12 VITALS
RESPIRATION RATE: 12 BRPM | WEIGHT: 230 LBS | DIASTOLIC BLOOD PRESSURE: 64 MMHG | SYSTOLIC BLOOD PRESSURE: 102 MMHG | BODY MASS INDEX: 31.15 KG/M2 | HEART RATE: 63 BPM | HEIGHT: 72 IN

## 2022-09-12 PROCEDURE — 99214 OFFICE O/P EST MOD 30 MIN: CPT

## 2022-09-12 PROCEDURE — 93000 ELECTROCARDIOGRAM COMPLETE: CPT

## 2022-09-12 NOTE — HISTORY OF PRESENT ILLNESS
[FreeTextEntry1] : Patient is a 78yo M with PAF, TIA, non-obstructive CAD, IRAM, AI here for cardiac follow up. Doing well without CP. No palps/dizziness/syncope. REmains sedentary since pandemic.  Patient denies PND/orthopnea/edema/palpitations/syncope/claudication \par \par Has 1 year old granddaughter, has total of 10. \par \par ROS: GI and  negative

## 2022-09-12 NOTE — ASSESSMENT
[FreeTextEntry1] : ECG: SR, 1st degree AV delay,  LAFB, low voltage\par \par  HDL 72 LDL 69 TG 60 (6/2020) \par  HDL 51 LDL 59 TG 70 (12/2018)\par \par \par \par PHARM NUCLEAR STRESS 5/2022:\par 1. NEgative for ischemia/infarct, EF 62%\par 2. NOrmal HR/BP response \par \par KAREN/PVR 5/2022:\par 1. R 1.23 L 1.27\par 2. Normal PVR \par \par ECHO 10/2021:\par 1. Normal LV size, systolic and diastolic function. EF 55-60%, sigmoid septum. Contrast used\par 2. Normal RV/LA/RA \par 3. Mild AI\par \par CAROTID 4/2019:\par 1. All arteries were clearly visualized.\par 2. No hemodynamically significant stenosis is noted in the left ICA.\par 3. No hemodynamically significant stenosis is noted in the right ICA.\par 4. There is antegrade flow in the right and left vertebral arteries.\par \par ECHO 4/2019:\par 1. Sigmoid septum without evidence of obstruction.\par 2. Normal left ventricular size and wall thickness, with normal systolic and diastolic function.\par 3. Left ventricular ejection fraction, by visual estimation, is 55 to 60%.\par 4. Normal RV/LA/RA \par 5. Mild to moderate posterior mitral annular calcification.\par 6. Mild aortic regurgitation.\par \par PHARM NUCLEAR STRESS TEST 4/2019:\par 1. Normal Sestamibi myocardial perfusion SPECT imaging.\par 2. Left ventricular function was normal with an EF of 61%.\par 3. The EKG was negative for ischemia.\par 4. The patient developed no dysrhythmias during stress.\par 5. The blood pressure response was normal.\par \par \par CARDIAC CATH 2013: 50% LAD stenosis, CALCIUM SCORE 100 in 2013\par AORTIC DUPLEX 2013: Negative for aneurysm\par \par

## 2022-09-12 NOTE — DISCUSSION/SUMMARY
[FreeTextEntry1] : Patient is a 78yo M with PAF, TIA, non-obstructive CAD, IRAM, AI here for follow up \par -Echo fall 2021  showed normal biventricular function. Mild AI needs surveillance \par -Minimal plaque on carotid duplex in 2019\par -5/2022: KAREN/PVR normal and nuclear stress testing negative for ischemia. \par \par 1. Continue medical management of CAD, on statin. No need to add ASA as on Eliquis.Lipids at goal 6/2020\par 2. Continue anticoagulation for atrial fibrillation thromboembolic prophylaxis and rate control strategy\par 3. In SR now, does not feel when in AF\par 4. Recommend aggressive diet and lifestyle modifications , counselled on weight loss \par 5. Follow up 6 months

## 2022-11-09 RX ORDER — KIT FOR THE PREPARATION OF TECHNETIUM TC99M SESTAMIBI 1 MG/5ML
INJECTION, POWDER, LYOPHILIZED, FOR SOLUTION PARENTERAL
Refills: 0 | Status: COMPLETED | OUTPATIENT
Start: 2022-11-09

## 2022-11-09 RX ADMIN — KIT FOR THE PREPARATION OF TECHNETIUM TC99M SESTAMIBI 0: 1 INJECTION, POWDER, LYOPHILIZED, FOR SOLUTION PARENTERAL at 00:00

## 2023-02-27 ENCOUNTER — NON-APPOINTMENT (OUTPATIENT)
Age: 78
End: 2023-02-27

## 2023-02-27 ENCOUNTER — APPOINTMENT (OUTPATIENT)
Dept: CARDIOLOGY | Facility: CLINIC | Age: 78
End: 2023-02-27
Payer: MEDICARE

## 2023-02-27 VITALS
BODY MASS INDEX: 32.1 KG/M2 | SYSTOLIC BLOOD PRESSURE: 112 MMHG | HEART RATE: 67 BPM | RESPIRATION RATE: 15 BRPM | HEIGHT: 72 IN | DIASTOLIC BLOOD PRESSURE: 70 MMHG | OXYGEN SATURATION: 98 % | WEIGHT: 237 LBS

## 2023-02-27 DIAGNOSIS — F17.200 NICOTINE DEPENDENCE, UNSPECIFIED, UNCOMPLICATED: ICD-10-CM

## 2023-02-27 PROCEDURE — 99214 OFFICE O/P EST MOD 30 MIN: CPT

## 2023-02-27 PROCEDURE — 93000 ELECTROCARDIOGRAM COMPLETE: CPT

## 2023-03-01 NOTE — ASSESSMENT
[FreeTextEntry1] : ECG: SR, 1st degree AV delay,  LAFB, low voltage\par \par \par \par  HDL 72 LDL 69 TG 60 (6/2020) \par  HDL 51 LDL 59 TG 70 (12/2018)\par \par \par \par PHARM NUCLEAR STRESS 5/2022:\par 1. Negative for ischemia/infarct, EF 62%\par 2. Normal HR/BP response \par \par KAREN/PVR 5/2022:\par 1. R 1.23 L 1.27\par 2. Normal PVR \par \par ECHO 10/2021:\par 1. Normal LV size, systolic and diastolic function. EF 55-60%, sigmoid septum. Contrast used\par 2. Normal RV/LA/RA \par 3. Mild AI\par \par CAROTID 4/2019:\par 1. All arteries were clearly visualized.\par 2. No hemodynamically significant stenosis is noted in the left ICA.\par 3. No hemodynamically significant stenosis is noted in the right ICA.\par 4. There is antegrade flow in the right and left vertebral arteries.\par \par ECHO 4/2019:\par 1. Sigmoid septum without evidence of obstruction.\par 2. Normal left ventricular size and wall thickness, with normal systolic and diastolic function.\par 3. Left ventricular ejection fraction, by visual estimation, is 55 to 60%.\par 4. Normal RV/LA/RA \par 5. Mild to moderate posterior mitral annular calcification.\par 6. Mild aortic regurgitation.\par \par PHARM NUCLEAR STRESS TEST 4/2019:\par 1. Normal Sestamibi myocardial perfusion SPECT imaging.\par 2. Left ventricular function was normal with an EF of 61%.\par 3. The EKG was negative for ischemia.\par 4. The patient developed no dysrhythmias during stress.\par 5. The blood pressure response was normal.\par \par \par CARDIAC CATH 2013: 50% LAD stenosis, CALCIUM SCORE 100 in 2013\par AORTIC DUPLEX 2013: Negative for aneurysm\par \par

## 2023-03-01 NOTE — DISCUSSION/SUMMARY
[EKG obtained to assist in diagnosis and management of assessed problem(s)] : EKG obtained to assist in diagnosis and management of assessed problem(s) [FreeTextEntry1] : Patient is a 76yo M with PAF, TIA, non-obstructive CAD, IRAM, AI here for follow up \par -Echo fall 2021  showed normal biventricular function. Mild AI needs surveillance \par -Minimal plaque on carotid duplex in 2019\par -5/2022: KAREN/PVR normal and nuclear stress testing negative for ischemia. \par \par 1. Continue medical management of CAD, on statin. No need to add ASA as on Eliquis.Lipids at goal 6/2020 and will obtain more recent BW from 12/2022\par 2. Continue anticoagulation for atrial fibrillation thromboembolic prophylaxis and rate control strategy\par 3. In SR now, does not feel when in AF, but always in SR when here. NO indication for AAT/ablation or further event monitoring at this time. \par 4. Recommend aggressive diet and lifestyle modifications , counselled on weight loss \par 5. Follow up 6 months with echo then to evaluate chamber dimensions given PAF

## 2023-03-01 NOTE — HISTORY OF PRESENT ILLNESS
[FreeTextEntry1] : Patient is a 78yo M with PAF, TIA, non-obstructive CAD, RIAM, AI here for cardiac follow up. No recent CP/SOB. No palps/dizziness/syncope. REmains sedentary since pandemic unfortunately.  Able to go up stairs, limited by knees. Patient denies PND/orthopnea/edema/palpitations/syncope/claudication \par \par Has 1 year old granddaughter, has total of 10. HAs 3 kids and 2 stepsons\par \par ROS: GI and  negative

## 2023-06-08 ENCOUNTER — RX RENEWAL (OUTPATIENT)
Age: 78
End: 2023-06-08

## 2023-06-09 ENCOUNTER — NON-APPOINTMENT (OUTPATIENT)
Age: 78
End: 2023-06-09

## 2023-06-12 ENCOUNTER — APPOINTMENT (OUTPATIENT)
Dept: CARDIOLOGY | Facility: CLINIC | Age: 78
End: 2023-06-12
Payer: MEDICARE

## 2023-06-12 VITALS
SYSTOLIC BLOOD PRESSURE: 112 MMHG | RESPIRATION RATE: 15 BRPM | OXYGEN SATURATION: 96 % | HEART RATE: 77 BPM | WEIGHT: 234 LBS | BODY MASS INDEX: 31.69 KG/M2 | DIASTOLIC BLOOD PRESSURE: 70 MMHG | HEIGHT: 72 IN

## 2023-06-12 PROCEDURE — 99214 OFFICE O/P EST MOD 30 MIN: CPT

## 2023-06-12 NOTE — HISTORY OF PRESENT ILLNESS
[FreeTextEntry1] : Patient is a 78yo M with PAF, TIA, non-obstructive CAD, IRAM, AI here for cardiac follow up. No recent CP/SOB (some mild intermittent chest pain for many years unchanged). No palps/dizziness/syncope. REmains sedentary since pandemic unfortunately.  Able to go up stairs, limited by knees. Patient denies PND/orthopnea/edema/palpitations/syncope/claudication \par \par Put on prozac last month, has been helping. Noting hand tingling recently and sharp pain in left arm. Also pain in back of right shoulder. Noting tingling in right foot.  ALso some stiffness in hands in morning. Pain in arm worse with movement and twisting. \par \par Has 1 year old granddaughter, has total of 10. HAs 3 kids and 2 stepsons\par \par ROS: GI and  negative

## 2023-06-12 NOTE — ASSESSMENT
[FreeTextEntry1] : ECG: SR, 1st degree AV delay,  LAFB (from PMD 6/7/2023)\par \par \par  HDL 51 LDL 59 TG 49 (2/2023)\par  HDL 72 LDL 69 TG 60 (6/2020) \par  HDL 51 LDL 59 TG 70 (12/2018)\par \par \par \par PHARM NUCLEAR STRESS 5/2022:\par 1. Negative for ischemia/infarct, EF 62%\par 2. Normal HR/BP response \par \par KAREN/PVR 5/2022:\par 1. R 1.23 L 1.27\par 2. Normal PVR \par \par ECHO 10/2021:\par 1. Normal LV size, systolic and diastolic function. EF 55-60%, sigmoid septum. Contrast used\par 2. Normal RV/LA/RA \par 3. Mild AI\par \par CAROTID 4/2019:\par 1. All arteries were clearly visualized.\par 2. No hemodynamically significant stenosis is noted in the left ICA.\par 3. No hemodynamically significant stenosis is noted in the right ICA.\par 4. There is antegrade flow in the right and left vertebral arteries.\par \par ECHO 4/2019:\par 1. Sigmoid septum without evidence of obstruction.\par 2. Normal left ventricular size and wall thickness, with normal systolic and diastolic function.\par 3. Left ventricular ejection fraction, by visual estimation, is 55 to 60%.\par 4. Normal RV/LA/RA \par 5. Mild to moderate posterior mitral annular calcification.\par 6. Mild aortic regurgitation.\par \par PHARM NUCLEAR STRESS TEST 4/2019:\par 1. Normal Sestamibi myocardial perfusion SPECT imaging.\par 2. Left ventricular function was normal with an EF of 61%.\par 3. The EKG was negative for ischemia.\par 4. The patient developed no dysrhythmias during stress.\par 5. The blood pressure response was normal.\par \par \par CARDIAC CATH 2013: 50% LAD stenosis, CALCIUM SCORE 100 in 2013\par AORTIC DUPLEX 2013: Negative for aneurysm\par \par

## 2023-06-12 NOTE — DISCUSSION/SUMMARY
[FreeTextEntry1] : Patient is a 76yo M with PAF, TIA, non-obstructive CAD, IRAM, AI here for follow up \par -Echo fall 2021  showed normal biventricular function. Mild AI needs surveillance \par -Minimal plaque on carotid duplex in 2019\par -5/2022: KAREN/PVR normal and nuclear stress testing negative for ischemia. \par \par -REcent arm pain muscular, ? neuropathy in right foot. Follow up PMD an dconsider neuro eval. Also with OA pains. \par \par 1. Continue medical management of CAD, on statin. No need to add ASA as on Eliquis.Lipids at goal 2/2023 \par 2. Continue anticoagulation for atrial fibrillation thromboembolic prophylaxis and rate control strategy\par 3. In SR now, does not feel when in AF, but always in SR when here. NO indication for AAT/ablation or further event monitoring at this time. \par 4. Recommend aggressive diet and lifestyle modifications , counselled on weight loss \par 5. Follow up later this summer with echo planned and follow up

## 2023-08-08 ENCOUNTER — APPOINTMENT (OUTPATIENT)
Dept: CARDIOLOGY | Facility: CLINIC | Age: 78
End: 2023-08-08
Payer: MEDICARE

## 2023-08-08 PROCEDURE — 93306 TTE W/DOPPLER COMPLETE: CPT

## 2023-08-08 RX ADMIN — PERFLUTREN MG/ML: 6.52 INJECTION, SUSPENSION INTRAVENOUS at 00:00

## 2023-08-14 RX ORDER — PERFLUTREN 6.52 MG/ML
6.52 INJECTION, SUSPENSION INTRAVENOUS
Qty: 2 | Refills: 0 | Status: COMPLETED | OUTPATIENT
Start: 2023-08-08

## 2023-08-16 RX ORDER — APIXABAN 5 MG/1
5 TABLET, FILM COATED ORAL
Qty: 180 | Refills: 3 | Status: ACTIVE | COMMUNITY
Start: 2018-02-08 | End: 1900-01-01

## 2023-08-22 ENCOUNTER — NON-APPOINTMENT (OUTPATIENT)
Age: 78
End: 2023-08-22

## 2023-08-22 ENCOUNTER — APPOINTMENT (OUTPATIENT)
Dept: CARDIOLOGY | Facility: CLINIC | Age: 78
End: 2023-08-22
Payer: MEDICARE

## 2023-08-22 VITALS
BODY MASS INDEX: 31.83 KG/M2 | OXYGEN SATURATION: 96 % | HEIGHT: 72 IN | HEART RATE: 55 BPM | DIASTOLIC BLOOD PRESSURE: 60 MMHG | SYSTOLIC BLOOD PRESSURE: 94 MMHG | RESPIRATION RATE: 15 BRPM | WEIGHT: 235 LBS

## 2023-08-22 DIAGNOSIS — R42 DIZZINESS AND GIDDINESS: ICD-10-CM

## 2023-08-22 PROCEDURE — 99214 OFFICE O/P EST MOD 30 MIN: CPT

## 2023-08-22 PROCEDURE — 93000 ELECTROCARDIOGRAM COMPLETE: CPT

## 2023-08-22 NOTE — DISCUSSION/SUMMARY
[EKG obtained to assist in diagnosis and management of assessed problem(s)] : EKG obtained to assist in diagnosis and management of assessed problem(s) [FreeTextEntry1] : Patient is a 78yo M with PAF, TIA, non-obstructive CAD, IRAM, AI here for follow up  -Echo fall 2021  showed normal biventricular function. Mild AI needs surveillance  -Echo 8/2023 unchanged with mild AI only -Minimal plaque on carotid duplex in 2019 -5/2022: KAREN/PVR normal and nuclear stress testing negative for ischemia.  -BP running low, will cut back beta blocker and advised increased fluid intake     1. Continue medical management of CAD, on statin. No need to add ASA as on Eliquis. 2. Lipids at goal 2/2023  3. Continue anticoagulation for atrial fibrillation thromboembolic prophylaxis and rate control strategy 4. In SR now, does not feel when in AF, but always in SR when here. NO indication for AAT/ablation or further event monitoring at this time.  5. Recommend aggressive diet and lifestyle modifications , counselled on weight loss  6. Cut metoprolol to 12.5mg bid , increase fluid intake 7. Follow up 4 months

## 2023-08-22 NOTE — ASSESSMENT
[FreeTextEntry1] : ECG: SB,  LAFB     HDL 51 LDL 59 TG 49 (2/2023)  HDL 72 LDL 69 TG 60 (6/2020)   HDL 51 LDL 59 TG 70 (12/2018)  ECHO 8/2023: 1.  Sigmoid septum without LVOT obstruction 2. EF 55-60% 3. Normal RV/LA/RA  4. Mild AI  PHARM NUCLEAR STRESS 5/2022: 1. Negative for ischemia/infarct, EF 62% 2. Normal HR/BP response   KAREN/PVR 5/2022: 1. R 1.23 L 1.27 2. Normal PVR   ECHO 10/2021: 1. Normal LV size, systolic and diastolic function. EF 55-60%, sigmoid septum. Contrast used 2. Normal RV/LA/RA  3. Mild AI  CAROTID 4/2019: 1. All arteries were clearly visualized. 2. No hemodynamically significant stenosis is noted in the left ICA. 3. No hemodynamically significant stenosis is noted in the right ICA. 4. There is antegrade flow in the right and left vertebral arteries.  ECHO 4/2019: 1. Sigmoid septum without evidence of obstruction. 2. Normal left ventricular size and wall thickness, with normal systolic and diastolic function. 3. Left ventricular ejection fraction, by visual estimation, is 55 to 60%. 4. Normal RV/LA/RA  5. Mild to moderate posterior mitral annular calcification. 6. Mild aortic regurgitation.  PHARM NUCLEAR STRESS TEST 4/2019: 1. Normal Sestamibi myocardial perfusion SPECT imaging. 2. Left ventricular function was normal with an EF of 61%. 3. The EKG was negative for ischemia. 4. The patient developed no dysrhythmias during stress. 5. The blood pressure response was normal.   CARDIAC CATH 2013: 50% LAD stenosis, CALCIUM SCORE 100 in 2013 AORTIC DUPLEX 2013: Negative for aneurysm

## 2023-08-22 NOTE — HISTORY OF PRESENT ILLNESS
[FreeTextEntry1] : Patient is a 77yo M with PAF, TIA, non-obstructive CAD, IRAM, AI here for cardiac follow up. No recent CP/SOB (some mild intermittent chest pain for many years unchanged). No palps/dizziness/syncope. Remains sedentary since pandemic unfortunately.  OVer this past weekend noted episode of dizziness in afternoon but no syncope. THinks maybe drinking a bit less fluid as well. Mildly tired lately.   Has 1 year old granddaughter, has total of 10. HAs 3 kids and 2 stepsons  ROS: GI and  negative

## 2023-12-18 ENCOUNTER — APPOINTMENT (OUTPATIENT)
Dept: CARDIOLOGY | Facility: CLINIC | Age: 78
End: 2023-12-18
Payer: MEDICARE

## 2023-12-18 ENCOUNTER — NON-APPOINTMENT (OUTPATIENT)
Age: 78
End: 2023-12-18

## 2023-12-18 VITALS
SYSTOLIC BLOOD PRESSURE: 108 MMHG | DIASTOLIC BLOOD PRESSURE: 68 MMHG | HEART RATE: 78 BPM | HEIGHT: 72 IN | BODY MASS INDEX: 32.64 KG/M2 | OXYGEN SATURATION: 96 % | WEIGHT: 241 LBS | RESPIRATION RATE: 15 BRPM

## 2023-12-18 DIAGNOSIS — R06.00 DYSPNEA, UNSPECIFIED: ICD-10-CM

## 2023-12-18 DIAGNOSIS — F17.290 NICOTINE DEPENDENCE, OTHER TOBACCO PRODUCT, UNCOMPLICATED: ICD-10-CM

## 2023-12-18 PROCEDURE — 93000 ELECTROCARDIOGRAM COMPLETE: CPT

## 2023-12-18 PROCEDURE — 99214 OFFICE O/P EST MOD 30 MIN: CPT

## 2024-01-17 NOTE — DISCUSSION/SUMMARY
[EKG obtained to assist in diagnosis and management of assessed problem(s)] : EKG obtained to assist in diagnosis and management of assessed problem(s) [FreeTextEntry1] : Patient is a 79yo M with PAF, TIA, non-obstructive CAD, IRAM, AI here for follow up  -Echo fall 2021  showed normal biventricular function. Mild AI needs surveillance  -Echo 8/2023 unchanged with mild AI only -Minimal plaque on carotid duplex in 2019 -5/2022: KAREN/PVR normal and nuclear stress testing negative for ischemia.   -Currently with AF, rate controlled. some recent non specific symptoms that maybe related. Also weight gain could be causing symptoms. Has been in AF in past and no symptoms. Will arrange MCOT and nuclear stress to ensure no signs ischemia. BW to be obtained as well. Recent echo with normal function and no signs increased LAP/PAP     1. Pharm nuclear stress and 2 week MCOT for reasons not above 2. Lipids at goal 2/2023 , will recheck now and BW to include CMP/Mg/CBC/TSH/BNP 3. Continue anticoagulation for atrial fibrillation thromboembolic prophylaxis and rate control strategy 4.  NO indication for AAT/ablation a this time pending further work up, ? symptomatic now 5. Recommend aggressive diet and lifestyle modifications , counselled on weight loss  6. Follow up after testing

## 2024-01-17 NOTE — ASSESSMENT
[FreeTextEntry1] :   ECG: AF with controlled VR, LAFB     HDL 51 LDL 59 TG 49 (2/2023)  HDL 72 LDL 69 TG 60 (6/2020)  HDL 51 LDL 59 TG 70 (12/2018)  ECHO 8/2023: 1. Sigmoid septum without LVOT obstruction 2. EF 55-60% 3. Normal RV/LA/RA 4. Mild AI  PHARM NUCLEAR STRESS 5/2022: 1. Negative for ischemia/infarct, EF 62% 2. Normal HR/BP response  KAREN/PVR 5/2022: 1. R 1.23 L 1.27 2. Normal PVR  ECHO 10/2021: 1. Normal LV size, systolic and diastolic function. EF 55-60%, sigmoid septum. Contrast used 2. Normal RV/LA/RA 3. Mild AI  CAROTID 4/2019: 1. All arteries were clearly visualized. 2. No hemodynamically significant stenosis is noted in the left ICA. 3. No hemodynamically significant stenosis is noted in the right ICA. 4. There is antegrade flow in the right and left vertebral arteries.  ECHO 4/2019: 1. Sigmoid septum without evidence of obstruction. 2. Normal left ventricular size and wall thickness, with normal systolic and diastolic function. 3. Left ventricular ejection fraction, by visual estimation, is 55 to 60%. 4. Normal RV/LA/RA 5. Mild to moderate posterior mitral annular calcification. 6. Mild aortic regurgitation.  PHARM NUCLEAR STRESS TEST 4/2019: 1. Normal Sestamibi myocardial perfusion SPECT imaging. 2. Left ventricular function was normal with an EF of 61%. 3. The EKG was negative for ischemia. 4. The patient developed no dysrhythmias during stress. 5. The blood pressure response was normal.   CARDIAC CATH 2013: 50% LAD stenosis, CALCIUM SCORE 100 in 2013 AORTIC DUPLEX 2013: Negative for aneurysm.

## 2024-01-17 NOTE — ADDENDUM
[FreeTextEntry1] : 1/8/2024: BW reviewed, unremarkable. Lipids at goal, BNP within normal range. Further testing planned as noted above  1/17/2024: MCOT reviewed, 79% AF/flutter burden (had both) with average HR 77. Stress testing pending and follow up. Has not had any symptoms related,.

## 2024-01-17 NOTE — HISTORY OF PRESENT ILLNESS
[FreeTextEntry1] : Patient is a 79yo M with PAF, TIA, non-obstructive CAD, IRAM, AI here for cardiac follow up. Metoprolol cut back last office visit as BP low.  States not feeling well past few weeks. Notes dyspnea but not always with exertion, does feel with walking but not always.  Mornings feels diaphoretic. Chronic non exertional CP for years. No PND/orthopnea/edema/palps/syncope. Gained some weight. Mood is better on FLuoxetine now.   Has 2 year old granddaughter, has total of 10. Has 3 kids and 2 stepsons  ROS: GI and  negative

## 2024-01-18 ENCOUNTER — NON-APPOINTMENT (OUTPATIENT)
Age: 79
End: 2024-01-18

## 2024-01-19 ENCOUNTER — NON-APPOINTMENT (OUTPATIENT)
Age: 79
End: 2024-01-19

## 2024-01-24 ENCOUNTER — APPOINTMENT (OUTPATIENT)
Dept: CARDIOLOGY | Facility: CLINIC | Age: 79
End: 2024-01-24
Payer: MEDICARE

## 2024-01-24 PROCEDURE — 93306 TTE W/DOPPLER COMPLETE: CPT

## 2024-01-24 RX ORDER — PERFLUTREN 6.52 MG/ML
6.52 INJECTION, SUSPENSION INTRAVENOUS
Qty: 2 | Refills: 0 | Status: COMPLETED | OUTPATIENT
Start: 2024-01-24

## 2024-01-30 ENCOUNTER — APPOINTMENT (OUTPATIENT)
Dept: CARDIOLOGY | Facility: CLINIC | Age: 79
End: 2024-01-30
Payer: MEDICARE

## 2024-01-30 PROCEDURE — 78452 HT MUSCLE IMAGE SPECT MULT: CPT

## 2024-01-30 PROCEDURE — 93015 CV STRESS TEST SUPVJ I&R: CPT

## 2024-01-30 PROCEDURE — A9500: CPT

## 2024-01-30 RX ORDER — REGADENOSON 0.08 MG/ML
0.4 INJECTION, SOLUTION INTRAVENOUS
Qty: 4 | Refills: 0 | Status: COMPLETED | OUTPATIENT
Start: 2024-01-30

## 2024-01-30 RX ADMIN — REGADENOSON 0.4 MG/5ML: 0.08 INJECTION, SOLUTION INTRAVENOUS at 00:00

## 2024-01-31 ENCOUNTER — APPOINTMENT (OUTPATIENT)
Dept: CARDIOLOGY | Facility: CLINIC | Age: 79
End: 2024-01-31

## 2024-02-02 RX ORDER — KIT FOR THE PREPARATION OF TECHNETIUM TC99M SESTAMIBI 1 MG/5ML
INJECTION, POWDER, LYOPHILIZED, FOR SOLUTION PARENTERAL
Refills: 0 | Status: COMPLETED | OUTPATIENT
Start: 2024-02-02

## 2024-02-02 RX ADMIN — KIT FOR THE PREPARATION OF TECHNETIUM TC99M SESTAMIBI 0: 1 INJECTION, POWDER, LYOPHILIZED, FOR SOLUTION PARENTERAL at 00:00

## 2024-02-09 ENCOUNTER — APPOINTMENT (OUTPATIENT)
Dept: CARDIOLOGY | Facility: CLINIC | Age: 79
End: 2024-02-09
Payer: MEDICARE

## 2024-02-09 VITALS
DIASTOLIC BLOOD PRESSURE: 80 MMHG | HEART RATE: 65 BPM | RESPIRATION RATE: 15 BRPM | OXYGEN SATURATION: 97 % | HEIGHT: 72 IN | WEIGHT: 245 LBS | SYSTOLIC BLOOD PRESSURE: 110 MMHG | BODY MASS INDEX: 33.18 KG/M2

## 2024-02-09 DIAGNOSIS — E66.9 OBESITY, UNSPECIFIED: ICD-10-CM

## 2024-02-09 DIAGNOSIS — I44.4 LEFT ANTERIOR FASCICULAR BLOCK: ICD-10-CM

## 2024-02-09 DIAGNOSIS — I35.1 NONRHEUMATIC AORTIC (VALVE) INSUFFICIENCY: ICD-10-CM

## 2024-02-09 PROCEDURE — G2211 COMPLEX E/M VISIT ADD ON: CPT

## 2024-02-09 PROCEDURE — 99214 OFFICE O/P EST MOD 30 MIN: CPT

## 2024-02-09 NOTE — DISCUSSION/SUMMARY
[FreeTextEntry1] : Patient is a 77yo M with PAF, TIA, non-obstructive CAD, IRAM, AI here for follow up  -Minimal plaque on carotid duplex in 2019 -5/2022: KAREN/PVR normal   -Echo 2/2024 with normal function and stable mild AI. Basal inferior wall hypo but pharm nuclear stress 1/2024 negative and no RWMA/signs of infarct. Cannot exclude apical lateral wall ischemia but significant artifact and dose not correlate with echo WMA. No symptoms to suggest ischemia now and will plan med managment -Lipids at goal 1/2024, normal BNP as well , TSH 2.6  -1/17/2024: MCOT reviewed, 79% AF/flutter burden (had both) with average HR 77.  Will refer to EP to evaluate further, ? change in symptoms in vs out of AF. May benefit from MITZY/DCCV and AAT vs ablation.     1. EP evaluation of his more persistent AF now 2. Lipids at goal 1/2024, med management of CAD 3. Continue anticoagulation for atrial fibrillation thromboembolic prophylaxis and rate control strategy 4.  REpeat echo later this year given RWMA inferior wall of ? significance, no inferior ischemia on stress 5. Recommend aggressive diet and lifestyle modifications , counselled on weight loss  6. Follow up 2-3 months

## 2024-02-09 NOTE — HISTORY OF PRESENT ILLNESS
[FreeTextEntry1] : Patient is a 77yo M with PAF, TIA, non-obstructive CAD, IRAM, AI here for cardiac follow up. Metoprolol cut back last office visit as BP low.  States not feeling well past few weeks. Notes dyspnea but not always with exertion, does feel with walking but not always.  Mornings feels diaphoretic. Chronic non exertional CP for years. No PND/orthopnea/edema/palps/syncope. Gained some weight. Mood is better on FLuoxetine now. Given symptoms and noted to be back in AF, underwent further cardiac work up after last OV. MCOT/echo/stress testing done  Notes some pain in left arm/shoulder. Doesnt feel  palps since last OV. Sometimes dyspneic.   Has 2 year old granddaughter, has total of 10. Has 3 kids and 2 stepsons  ROS: GI and  negative

## 2024-02-09 NOTE — ASSESSMENT
[FreeTextEntry1] : ECG: AF with controlled VR, LAFB    HDL 58 LDL 64 TG 80 (1/2024)   HDL 51 LDL 59 TG 49 (2/2023)  HDL 72 LDL 69 TG 60 (6/2020)  HDL 51 LDL 59 TG 70 (12/2018) A1C 5.8 (1/2024)  (1/2024)    1/17/2024: MCOT reviewed, 79% AF/flutter burden (had both) with average HR 77.   ECHO 2/2024: 1. Sigmoid septum without obstruction, EF 60% 2. Hypo basal inferior wall 3. Normal RV/LA/RA  4. Mild AI   ECHO 8/2023: 1. Sigmoid septum without LVOT obstruction 2. EF 55-60% 3. Normal RV/LA/RA 4. Mild AI  PHARM NUCLEAR STRESS 1/2024: 1. Negative for ischemia/infarct, EF 67% 2. Cannot exclude mild apical lateral wall ischemia 3. Normal BP response, resting 106/78 and peak 92/60 4. Noted to be in AF throughout  PHARM NUCLEAR STRESS 5/2022: 1. Negative for ischemia/infarct, EF 62% 2. Normal HR/BP response  KAREN/PVR 5/2022: 1. R 1.23 L 1.27 2. Normal PVR  ECHO 10/2021: 1. Normal LV size, systolic and diastolic function. EF 55-60%, sigmoid septum. Contrast used 2. Normal RV/LA/RA 3. Mild AI  CAROTID 4/2019: 1. All arteries were clearly visualized. 2. No hemodynamically significant stenosis is noted in the left ICA. 3. No hemodynamically significant stenosis is noted in the right ICA. 4. There is antegrade flow in the right and left vertebral arteries.  ECHO 4/2019: 1. Sigmoid septum without evidence of obstruction. 2. Normal left ventricular size and wall thickness, with normal systolic and diastolic function. 3. Left ventricular ejection fraction, by visual estimation, is 55 to 60%. 4. Normal RV/LA/RA 5. Mild to moderate posterior mitral annular calcification. 6. Mild aortic regurgitation.  PHARM NUCLEAR STRESS TEST 4/2019: 1. Normal Sestamibi myocardial perfusion SPECT imaging. 2. Left ventricular function was normal with an EF of 61%. 3. The EKG was negative for ischemia. 4. The patient developed no dysrhythmias during stress. 5. The blood pressure response was normal.   CARDIAC CATH 2013: 50% LAD stenosis, CALCIUM SCORE 100 in 2013 AORTIC DUPLEX 2013: Negative for aneurysm.

## 2024-03-14 ENCOUNTER — APPOINTMENT (OUTPATIENT)
Dept: ELECTROPHYSIOLOGY | Facility: CLINIC | Age: 79
End: 2024-03-14

## 2024-03-27 ENCOUNTER — APPOINTMENT (OUTPATIENT)
Dept: ELECTROPHYSIOLOGY | Facility: CLINIC | Age: 79
End: 2024-03-27
Payer: MEDICARE

## 2024-03-27 VITALS
WEIGHT: 246 LBS | SYSTOLIC BLOOD PRESSURE: 106 MMHG | HEART RATE: 94 BPM | OXYGEN SATURATION: 98 % | BODY MASS INDEX: 33.32 KG/M2 | DIASTOLIC BLOOD PRESSURE: 70 MMHG | HEIGHT: 72 IN

## 2024-03-27 VITALS — SYSTOLIC BLOOD PRESSURE: 98 MMHG | DIASTOLIC BLOOD PRESSURE: 60 MMHG

## 2024-03-27 PROCEDURE — 93000 ELECTROCARDIOGRAM COMPLETE: CPT

## 2024-03-27 PROCEDURE — 99204 OFFICE O/P NEW MOD 45 MIN: CPT

## 2024-03-27 NOTE — DISCUSSION/SUMMARY
[EKG obtained to assist in diagnosis and management of assessed problem(s)] : EKG obtained to assist in diagnosis and management of assessed problem(s) [FreeTextEntry1] : In summary, the patient has a history of HTN, TIA and symptomatic atrial fibrillation, previously paroxysmal and now more persistent AF.  He is on AV sameera blockers but continues to have symptoms. We discussed various therapeutic options including medical therapy and rhythm control (ablation vs antiarrhythmics). I recommended an ablation procedure. We discussed the details of the procedure including potential complications which include but are not limited to bleeding, hematoma, infection, damage to blood vessels, cardiac perforation, damage to the conduction system requiring pacemaker, MI, stroke and DVT. The patient will discuss with his family and call the office if he wishes to proceed.

## 2024-03-27 NOTE — HISTORY OF PRESENT ILLNESS
[FreeTextEntry1] : The patient is a 78-year-old male referred for arrhythmia management. The patient has a history of atrial fibrillation, TIA, IRAM and AI. He has a long history of paroxysmal atrial fibrillation but lately has been feeling short of breath. He wore an outpatient monitor in 1/2024 which revealed evidence of persistent atrial fibrillation (79% burden). He is on metoprolol 25 mg bid and eliquis for stroke prophylaxis. EF is 55-60% per most recent echo. The patient denies any symptoms of palpitations, dizziness, chest pain, syncope or extremity edema.

## 2024-03-27 NOTE — CARDIOLOGY SUMMARY
[de-identified] : 3/27/24: atrial fibrillation. Full Thickness Lip Wedge Repair (Flap) Text: Given the location of the defect and the proximity to free margins a full thickness wedge repair was deemed most appropriate.  Using a sterile surgical marker, the appropriate repair was drawn incorporating the defect and placing the expected incisions perpendicular to the vermilion border.  The vermilion border was also meticulously outlined to ensure appropriate reapproximation during the repair.  The area thus outlined was incised through and through with a #15 scalpel blade.  The muscularis and dermis were reaproximated with deep sutures following hemostasis. Care was taken to realign the vermilion border before proceeding with the superficial closure.  Once the vermilion was realigned the superfical and mucosal closure was finished.

## 2024-04-30 ENCOUNTER — OUTPATIENT (OUTPATIENT)
Dept: OUTPATIENT SERVICES | Facility: HOSPITAL | Age: 79
LOS: 1 days | End: 2024-04-30
Payer: MEDICARE

## 2024-04-30 VITALS
HEIGHT: 70 IN | DIASTOLIC BLOOD PRESSURE: 80 MMHG | HEART RATE: 66 BPM | TEMPERATURE: 97 F | WEIGHT: 246.92 LBS | SYSTOLIC BLOOD PRESSURE: 160 MMHG | RESPIRATION RATE: 18 BRPM | OXYGEN SATURATION: 96 %

## 2024-04-30 DIAGNOSIS — Z98.890 OTHER SPECIFIED POSTPROCEDURAL STATES: Chronic | ICD-10-CM

## 2024-04-30 DIAGNOSIS — Z96.611 PRESENCE OF RIGHT ARTIFICIAL SHOULDER JOINT: Chronic | ICD-10-CM

## 2024-04-30 DIAGNOSIS — Z01.818 ENCOUNTER FOR OTHER PREPROCEDURAL EXAMINATION: ICD-10-CM

## 2024-04-30 LAB
ALBUMIN SERPL ELPH-MCNC: 3.9 G/DL — SIGNIFICANT CHANGE UP (ref 3.3–5.2)
ALP SERPL-CCNC: 68 U/L — SIGNIFICANT CHANGE UP (ref 40–120)
ALT FLD-CCNC: 19 U/L — SIGNIFICANT CHANGE UP
ANION GAP SERPL CALC-SCNC: 11 MMOL/L — SIGNIFICANT CHANGE UP (ref 5–17)
APTT BLD: 36.2 SEC — HIGH (ref 24.5–35.6)
AST SERPL-CCNC: 26 U/L — SIGNIFICANT CHANGE UP
BASOPHILS # BLD AUTO: 0.04 K/UL — SIGNIFICANT CHANGE UP (ref 0–0.2)
BASOPHILS NFR BLD AUTO: 0.7 % — SIGNIFICANT CHANGE UP (ref 0–2)
BILIRUB SERPL-MCNC: 0.6 MG/DL — SIGNIFICANT CHANGE UP (ref 0.4–2)
BLD GP AB SCN SERPL QL: SIGNIFICANT CHANGE UP
BUN SERPL-MCNC: 19.9 MG/DL — SIGNIFICANT CHANGE UP (ref 8–20)
CALCIUM SERPL-MCNC: 9.5 MG/DL — SIGNIFICANT CHANGE UP (ref 8.4–10.5)
CHLORIDE SERPL-SCNC: 102 MMOL/L — SIGNIFICANT CHANGE UP (ref 96–108)
CO2 SERPL-SCNC: 27 MMOL/L — SIGNIFICANT CHANGE UP (ref 22–29)
CREAT SERPL-MCNC: 1.03 MG/DL — SIGNIFICANT CHANGE UP (ref 0.5–1.3)
EGFR: 74 ML/MIN/1.73M2 — SIGNIFICANT CHANGE UP
EOSINOPHIL # BLD AUTO: 0.12 K/UL — SIGNIFICANT CHANGE UP (ref 0–0.5)
EOSINOPHIL NFR BLD AUTO: 2.1 % — SIGNIFICANT CHANGE UP (ref 0–6)
GLUCOSE SERPL-MCNC: 115 MG/DL — HIGH (ref 70–99)
HCT VFR BLD CALC: 41.6 % — SIGNIFICANT CHANGE UP (ref 39–50)
HGB BLD-MCNC: 13.4 G/DL — SIGNIFICANT CHANGE UP (ref 13–17)
IMM GRANULOCYTES NFR BLD AUTO: 0.2 % — SIGNIFICANT CHANGE UP (ref 0–0.9)
INR BLD: 1.46 RATIO — HIGH (ref 0.85–1.18)
LYMPHOCYTES # BLD AUTO: 1.68 K/UL — SIGNIFICANT CHANGE UP (ref 1–3.3)
LYMPHOCYTES # BLD AUTO: 28.8 % — SIGNIFICANT CHANGE UP (ref 13–44)
MAGNESIUM SERPL-MCNC: 2 MG/DL — SIGNIFICANT CHANGE UP (ref 1.6–2.6)
MCHC RBC-ENTMCNC: 27 PG — SIGNIFICANT CHANGE UP (ref 27–34)
MCHC RBC-ENTMCNC: 32.2 GM/DL — SIGNIFICANT CHANGE UP (ref 32–36)
MCV RBC AUTO: 83.7 FL — SIGNIFICANT CHANGE UP (ref 80–100)
MONOCYTES # BLD AUTO: 0.65 K/UL — SIGNIFICANT CHANGE UP (ref 0–0.9)
MONOCYTES NFR BLD AUTO: 11.1 % — SIGNIFICANT CHANGE UP (ref 2–14)
NEUTROPHILS # BLD AUTO: 3.34 K/UL — SIGNIFICANT CHANGE UP (ref 1.8–7.4)
NEUTROPHILS NFR BLD AUTO: 57.1 % — SIGNIFICANT CHANGE UP (ref 43–77)
PLATELET # BLD AUTO: 193 K/UL — SIGNIFICANT CHANGE UP (ref 150–400)
POTASSIUM SERPL-MCNC: 4.5 MMOL/L — SIGNIFICANT CHANGE UP (ref 3.5–5.3)
POTASSIUM SERPL-SCNC: 4.5 MMOL/L — SIGNIFICANT CHANGE UP (ref 3.5–5.3)
PROT SERPL-MCNC: 6.5 G/DL — LOW (ref 6.6–8.7)
PROTHROM AB SERPL-ACNC: 16 SEC — HIGH (ref 9.5–13)
RBC # BLD: 4.97 M/UL — SIGNIFICANT CHANGE UP (ref 4.2–5.8)
RBC # FLD: 15.1 % — HIGH (ref 10.3–14.5)
SODIUM SERPL-SCNC: 140 MMOL/L — SIGNIFICANT CHANGE UP (ref 135–145)
WBC # BLD: 5.84 K/UL — SIGNIFICANT CHANGE UP (ref 3.8–10.5)
WBC # FLD AUTO: 5.84 K/UL — SIGNIFICANT CHANGE UP (ref 3.8–10.5)

## 2024-04-30 PROCEDURE — 85025 COMPLETE CBC W/AUTO DIFF WBC: CPT

## 2024-04-30 PROCEDURE — G0463: CPT

## 2024-04-30 PROCEDURE — 83735 ASSAY OF MAGNESIUM: CPT

## 2024-04-30 PROCEDURE — 86850 RBC ANTIBODY SCREEN: CPT

## 2024-04-30 PROCEDURE — 93005 ELECTROCARDIOGRAM TRACING: CPT

## 2024-04-30 PROCEDURE — 85610 PROTHROMBIN TIME: CPT

## 2024-04-30 PROCEDURE — 80053 COMPREHEN METABOLIC PANEL: CPT

## 2024-04-30 PROCEDURE — 93010 ELECTROCARDIOGRAM REPORT: CPT

## 2024-04-30 PROCEDURE — 86900 BLOOD TYPING SEROLOGIC ABO: CPT

## 2024-04-30 PROCEDURE — 86901 BLOOD TYPING SEROLOGIC RH(D): CPT

## 2024-04-30 PROCEDURE — 36415 COLL VENOUS BLD VENIPUNCTURE: CPT

## 2024-04-30 PROCEDURE — 85730 THROMBOPLASTIN TIME PARTIAL: CPT

## 2024-04-30 NOTE — H&P PST ADULT - NSICDXFAMILYHX_GEN_ALL_CORE_FT
FAMILY HISTORY:  Mother  Still living? Unknown  Family history of CHF (congestive heart failure), Age at diagnosis: Age Unknown    Sibling  Still living? Unknown  Family history of Parkinson's disease, Age at diagnosis: Age Unknown  Family hx of ALS (amyotrophic lateral sclerosis), Age at diagnosis: Age Unknown     FAMILY HISTORY:  Mother  Still living? Unknown  Family history of CHF (congestive heart failure), Age at diagnosis: Age Unknown    Sibling  Still living? Unknown  Family hx of ALS (amyotrophic lateral sclerosis), Age at diagnosis: Age Unknown

## 2024-04-30 NOTE — H&P PST ADULT - ASSESSMENT
Plan/Recommendations:   -plan for **** on ***  -patient seen and examined  -ECG and Labs reviewed  -NPO after midnight prior with exception of sip of water with morning medications  -Continue/Hold antiarrhythmics ................................  -Pre-procedure instructions provided (verbal & written)   -Supplies and verbal/written Instructions for pre-surgical chlorhexadine shower provided*****  -Consent to be obtained by attending electrophysiologist on the scheduled procedure date Plan/Recommendations:   -plan for Afib ablation 5/6/2024  -patient seen and examined  -ECG and Labs reviewed  -NPO after midnight prior with exception of sip of water with morning medications  -hold eliquis morning of procedure   -Pre-procedure instructions provided (verbal & written)   -Consent to be obtained by attending electrophysiologist on the scheduled procedure date

## 2024-04-30 NOTE — H&P PST ADULT - NSICDXPASTMEDICALHX_GEN_ALL_CORE_FT
PAST MEDICAL HISTORY:  Anxiety and depression     Chronic atrial fibrillation     H/O TIA (transient ischemic attack) and stroke     IRAM (obstructive sleep apnea)     S/P foot surgery      PAST MEDICAL HISTORY:  Anxiety and depression     Chronic atrial fibrillation     H/O TIA (transient ischemic attack) and stroke     IRAM (obstructive sleep apnea)

## 2024-04-30 NOTE — H&P PST ADULT - HISTORY OF PRESENT ILLNESS
Department of Cardiology                                                                  [facility]/[place] [address]                                                            Telephone: [phone]. Fax:[phone]                                                                                    PST H & P       HPI: 78 year old male with PMH of atrial fibrillation, TIA, IRAM and AI. He has a long history of paroxysmal atrial fibrillation but lately has been feeling short of breath. He wore an outpatient monitor in 1/2024 which revealed evidence of persistent atrial fibrillation (79% burden). He is on metoprolol 25 mg bid and eliquis for stroke prophylaxis. EF is 55-60% per most recent echo. The patient denies any symptoms of palpitations, dizziness, chest pain, syncope or extremity edema. He now presents to [facility] for upcoming AF ablation on 5/6/2024 with Dr. Patricio     Cardiology summary  1/17/2024: MCOT reviewed, 79% AF/flutter burden (had both) with average HR 77.  ?  ECHO 2/2024:  1. Sigmoid septum without obstruction, EF 60%  2. Hypo basal inferior wall  3. Normal RV/LA/RA  4. Mild AI  ?  ECHO 8/2023:  1. Sigmoid septum without LVOT obstruction  2. EF 55-60%  3. Normal RV/LA/RA  4. Mild AI  ?  PHARM NUCLEAR STRESS 1/2024:  1. Negative for ischemia/infarct, EF 67%  2. Cannot exclude mild apical lateral wall ischemia  3. Normal BP response, resting 106/78 and peak 92/60  4. Noted to be in AF throughout    CARDIAC CATH 2013: 50% LAD stenosis, CALCIUM SCORE 100 in 2013    Anticoagulation Therapies:  Eliquis 5mg BID     MEDICATIONS:                  ROS: as stated above, otherwise negative    PHYSICAL EXAM:  Constitutional: A & O x 3  HEENT:   Normal oral mucosa, PERRL, EOMI	  Cardiovascular: Normal S1 S2, No JVD, *****No murmurs  Respiratory: Lungs clear to auscultation	****  Gastrointestinal:  Soft, Non-tender, + BS	  Skin: No rashes, No ecchymoses, No cyanosis  Neurologic: Non-focal  Extremities: Normal range of motion, no edema****  Vascular: Peripheral pulses palpable + bilaterally ****    ECG:  	    LABS:	 	                                                                                                                     Department of Cardiology                                                                  [facility]/[place] [address] [facility]                                                            Telephone: [phone]. Fax:[phone]                                                                                    PST H & P       HPI: 78 year old male with PMH of atrial fibrillation, TIA, IRMA and AI. He has a long history of paroxysmal atrial fibrillation but lately has been feeling short of breath. He wore an outpatient monitor in 1/2024 which revealed evidence of persistent atrial fibrillation (79% burden). He is on metoprolol 25 mg bid and eliquis for stroke prophylaxis. EF is 55-60% per most recent echo. The patient denies any symptoms of palpitations, dizziness, chest pain, syncope or extremity edema. He now presents to [facility] for upcoming AF ablation on 5/6/2024 with Dr. Patricio     Cardiology summary  1/17/2024: MCOT reviewed, 79% AF/flutter burden (had both) with average HR 77.  ?  ECHO 2/2024:  1. Sigmoid septum without obstruction, EF 60%  2. Hypo basal inferior wall  3. Normal RV/LA/RA  4. Mild AI  ?  ECHO 8/2023:  1. Sigmoid septum without LVOT obstruction  2. EF 55-60%  3. Normal RV/LA/RA  4. Mild AI  ?  PHARM NUCLEAR STRESS 1/2024:  1. Negative for ischemia/infarct, EF 67%  2. Cannot exclude mild apical lateral wall ischemia  3. Normal BP response, resting 106/78 and peak 92/60  4. Noted to be in AF throughout    CARDIAC CATH 2013: 50% LAD stenosis, CALCIUM SCORE 100 in 2013    Anticoagulation Therapies:  Eliquis 5mg BID     Home Medications:  atorvastatin 20 mg oral tablet: 1 tab(s) orally once a day (at bedtime) (30 Apr 2024 14:24)  BuPROPion (Eqv-Wellbutrin SR) 150 mg/12 hours oral tablet, extended release: 3 tab(s) orally once a day (30 Apr 2024 14:24)  doxazosin 2 mg oral tablet: 1 tab(s) orally once a day (30 Apr 2024 14:24)  Eliquis 5 mg oral tablet: 1 tab(s) orally 2 times a day (30 Apr 2024 14:24)  FLUoxetine (Eqv-PROzac) 10 mg oral tablet: 1 tab(s) orally once a day (30 Apr 2024 14:24)  Metoprolol Tartrate 25 mg oral tablet: 1 tab(s) orally 2 times a day (30 Apr 2024 14:24)    ROS: as stated above, otherwise negative    PHYSICAL EXAM:  Constitutional: A & O x 3  HEENT:   Normal oral mucosa, PERRL, EOMI	  Cardiovascular: Normal S1 S2, No JVD, No murmurs  Respiratory: Lungs clear to auscultation	  Gastrointestinal:  Soft, Non-tender, + BS	  Skin: No rashes, No ecchymoses, No cyanosis  Neurologic: Non-focal  Extremities: Normal range of motion, no edema +pretib edema  Vascular: Peripheral pulses palpable + bilaterally    ECG:  	Afib 70bpm    LABS:	 	                                                                                                                     Department of Cardiology                                                                  [facility]/[place] [address] [facility]                                                            Telephone: [phone]. Fax:[phone]                                                                                    PST H & P       HPI: 78 year old male with PMH of atrial fibrillation, TIA, IRAM and AI. He has a long history of paroxysmal atrial fibrillation but lately has been feeling short of breath. He wore an outpatient monitor in 1/2024 which revealed evidence of persistent atrial fibrillation (79% burden). He is on metoprolol 25 mg bid and eliquis for stroke prophylaxis. EF is 55-60% per most recent echo. The patient denies any symptoms of palpitations, dizziness, chest pain, syncope or extremity edema. He now presents to [facility] for upcoming AF ablation on 5/6/2024 with Dr. Patricio     Cardiology summary  1/17/2024: MCOT reviewed, 79% AF/flutter burden (had both) with average HR 77.  ?  ECHO 2/2024:  1. Sigmoid septum without obstruction, EF 60%  2. Hypo basal inferior wall  3. Normal RV/LA/RA  4. Mild AI  ?  ECHO 8/2023:  1. Sigmoid septum without LVOT obstruction  2. EF 55-60%  3. Normal RV/LA/RA  4. Mild AI  ?  PHARM NUCLEAR STRESS 1/2024:  1. Negative for ischemia/infarct, EF 67%  2. Cannot exclude mild apical lateral wall ischemia  3. Normal BP response, resting 106/78 and peak 92/60  4. Noted to be in AF throughout    CARDIAC CATH 2013: 50% LAD stenosis, CALCIUM SCORE 100 in 2013    Anticoagulation Therapies:  Eliquis 5mg BID     Home Medications:  atorvastatin 20 mg oral tablet: 1 tab(s) orally once a day (at bedtime) (30 Apr 2024 14:24)  BuPROPion (Eqv-Wellbutrin SR) 150 mg/12 hours oral tablet, extended release: 3 tab(s) orally once a day (30 Apr 2024 14:24)  doxazosin 2 mg oral tablet: 1 tab(s) orally once a day (30 Apr 2024 14:24)  Eliquis 5 mg oral tablet: 1 tab(s) orally 2 times a day (30 Apr 2024 14:24)  FLUoxetine (Eqv-PROzac) 10 mg oral tablet: 1 tab(s) orally once a day (30 Apr 2024 14:24)  Metoprolol Tartrate 25 mg oral tablet: 1 tab(s) orally 2 times a day (30 Apr 2024 14:24)    ROS: as stated above, otherwise negative    PHYSICAL EXAM:  Constitutional: A & O x 3  HEENT:   Normal oral mucosa, PERRL, EOMI	  Cardiovascular: Normal S1 S2, No JVD, No murmurs  Respiratory: Lungs clear to auscultation	  Gastrointestinal:  Soft, Non-tender, + BS	  Skin: No rashes, No ecchymoses, No cyanosis  Neurologic: Non-focal  Extremities: Normal range of motion, no edema +pretib edema  Vascular: Peripheral pulses palpable + bilaterally    ECG:  	Afib 70bpm    LABS:	 	                        13.4   5.84  )-----------( 193      ( 30 Apr 2024 13:55 )             41.6   04-30    140  |  102  |  19.9  ----------------------------<  115<H>  4.5   |  27.0  |  1.03    Ca    9.5      30 Apr 2024 13:55  Mg     2.0     04-30    TPro  6.5<L>  /  Alb  3.9  /  TBili  0.6  /  DBili  x   /  AST  26  /  ALT  19  /  AlkPhos  68  04-30      PT/INR - ( 30 Apr 2024 13:55 )   PT: 16.0 sec;   INR: 1.46 ratio         PTT - ( 30 Apr 2024 13:55 )  PTT:36.2 sec

## 2024-05-03 ENCOUNTER — RX RENEWAL (OUTPATIENT)
Age: 79
End: 2024-05-03

## 2024-05-06 ENCOUNTER — INPATIENT (INPATIENT)
Facility: HOSPITAL | Age: 79
LOS: 0 days | Discharge: HOME | DRG: 274 | End: 2024-05-07
Attending: INTERNAL MEDICINE | Admitting: INTERNAL MEDICINE
Payer: COMMERCIAL

## 2024-05-06 ENCOUNTER — TRANSCRIPTION ENCOUNTER (OUTPATIENT)
Age: 79
End: 2024-05-06

## 2024-05-06 VITALS
TEMPERATURE: 98 F | DIASTOLIC BLOOD PRESSURE: 87 MMHG | SYSTOLIC BLOOD PRESSURE: 123 MMHG | HEART RATE: 80 BPM | RESPIRATION RATE: 22 BRPM | OXYGEN SATURATION: 98 %

## 2024-05-06 DIAGNOSIS — I48.91 UNSPECIFIED ATRIAL FIBRILLATION: ICD-10-CM

## 2024-05-06 DIAGNOSIS — Z98.890 OTHER SPECIFIED POSTPROCEDURAL STATES: Chronic | ICD-10-CM

## 2024-05-06 DIAGNOSIS — Z96.611 PRESENCE OF RIGHT ARTIFICIAL SHOULDER JOINT: Chronic | ICD-10-CM

## 2024-05-06 LAB — ABO RH CONFIRMATION: SIGNIFICANT CHANGE UP

## 2024-05-06 PROCEDURE — 93655 ICAR CATH ABLTJ DSCRT ARRHYT: CPT

## 2024-05-06 PROCEDURE — 93010 ELECTROCARDIOGRAM REPORT: CPT

## 2024-05-06 PROCEDURE — 93656 COMPRE EP EVAL ABLTJ ATR FIB: CPT

## 2024-05-06 RX ORDER — FLUOXETINE HCL 10 MG
1 CAPSULE ORAL
Refills: 0 | DISCHARGE

## 2024-05-06 RX ORDER — COLCHICINE 0.6 MG
0.3 TABLET ORAL DAILY
Refills: 0 | Status: DISCONTINUED | OUTPATIENT
Start: 2024-05-07 | End: 2024-05-07

## 2024-05-06 RX ORDER — BENZOCAINE AND MENTHOL 5; 1 G/100ML; G/100ML
1 LIQUID ORAL EVERY 6 HOURS
Refills: 0 | Status: DISCONTINUED | OUTPATIENT
Start: 2024-05-06 | End: 2024-05-07

## 2024-05-06 RX ORDER — APIXABAN 2.5 MG/1
5 TABLET, FILM COATED ORAL EVERY 12 HOURS
Refills: 0 | Status: DISCONTINUED | OUTPATIENT
Start: 2024-05-06 | End: 2024-05-07

## 2024-05-06 RX ORDER — APIXABAN 2.5 MG/1
1 TABLET, FILM COATED ORAL
Refills: 0 | DISCHARGE

## 2024-05-06 RX ORDER — SUCRALFATE 1 G
1 TABLET ORAL
Refills: 0 | Status: DISCONTINUED | OUTPATIENT
Start: 2024-05-06 | End: 2024-05-07

## 2024-05-06 RX ORDER — DOXAZOSIN MESYLATE 4 MG
2 TABLET ORAL AT BEDTIME
Refills: 0 | Status: DISCONTINUED | OUTPATIENT
Start: 2024-05-06 | End: 2024-05-07

## 2024-05-06 RX ORDER — METOPROLOL TARTRATE 50 MG
1 TABLET ORAL
Refills: 0 | DISCHARGE

## 2024-05-06 RX ORDER — ATORVASTATIN CALCIUM 80 MG/1
1 TABLET, FILM COATED ORAL
Refills: 0 | DISCHARGE

## 2024-05-06 RX ORDER — FUROSEMIDE 40 MG
20 TABLET ORAL ONCE
Refills: 0 | Status: COMPLETED | OUTPATIENT
Start: 2024-05-06 | End: 2024-05-06

## 2024-05-06 RX ORDER — PANTOPRAZOLE SODIUM 20 MG/1
40 TABLET, DELAYED RELEASE ORAL
Refills: 0 | Status: DISCONTINUED | OUTPATIENT
Start: 2024-05-06 | End: 2024-05-07

## 2024-05-06 RX ORDER — OXYCODONE HYDROCHLORIDE 5 MG/1
10 TABLET ORAL EVERY 6 HOURS
Refills: 0 | Status: DISCONTINUED | OUTPATIENT
Start: 2024-05-06 | End: 2024-05-07

## 2024-05-06 RX ORDER — APIXABAN 2.5 MG/1
5 TABLET, FILM COATED ORAL
Refills: 0 | Status: DISCONTINUED | OUTPATIENT
Start: 2024-05-06 | End: 2024-05-06

## 2024-05-06 RX ORDER — BUPROPION HYDROCHLORIDE 150 MG/1
450 TABLET, EXTENDED RELEASE ORAL DAILY
Refills: 0 | Status: DISCONTINUED | OUTPATIENT
Start: 2024-05-06 | End: 2024-05-07

## 2024-05-06 RX ORDER — METOPROLOL TARTRATE 50 MG
25 TABLET ORAL
Refills: 0 | Status: DISCONTINUED | OUTPATIENT
Start: 2024-05-06 | End: 2024-05-07

## 2024-05-06 RX ORDER — DOXAZOSIN MESYLATE 4 MG
1 TABLET ORAL
Refills: 0 | DISCHARGE

## 2024-05-06 RX ORDER — ACETAMINOPHEN 500 MG
650 TABLET ORAL EVERY 6 HOURS
Refills: 0 | Status: DISCONTINUED | OUTPATIENT
Start: 2024-05-06 | End: 2024-05-07

## 2024-05-06 RX ORDER — BUPROPION HYDROCHLORIDE 150 MG/1
3 TABLET, EXTENDED RELEASE ORAL
Refills: 0 | DISCHARGE

## 2024-05-06 RX ORDER — ATORVASTATIN CALCIUM 80 MG/1
20 TABLET, FILM COATED ORAL AT BEDTIME
Refills: 0 | Status: DISCONTINUED | OUTPATIENT
Start: 2024-05-06 | End: 2024-05-07

## 2024-05-06 RX ORDER — COLCHICINE 0.6 MG
0.6 TABLET ORAL ONCE
Refills: 0 | Status: COMPLETED | OUTPATIENT
Start: 2024-05-06 | End: 2024-05-06

## 2024-05-06 RX ORDER — FLUOXETINE HCL 10 MG
10 CAPSULE ORAL DAILY
Refills: 0 | Status: DISCONTINUED | OUTPATIENT
Start: 2024-05-06 | End: 2024-05-07

## 2024-05-06 RX ORDER — AMIODARONE HYDROCHLORIDE 400 MG/1
400 TABLET ORAL
Refills: 0 | Status: DISCONTINUED | OUTPATIENT
Start: 2024-05-06 | End: 2024-05-07

## 2024-05-06 RX ADMIN — PANTOPRAZOLE SODIUM 40 MILLIGRAM(S): 20 TABLET, DELAYED RELEASE ORAL at 18:21

## 2024-05-06 RX ADMIN — Medication 2 MILLIGRAM(S): at 21:14

## 2024-05-06 RX ADMIN — AMIODARONE HYDROCHLORIDE 400 MILLIGRAM(S): 400 TABLET ORAL at 18:22

## 2024-05-06 RX ADMIN — Medication 1 GRAM(S): at 18:22

## 2024-05-06 RX ADMIN — Medication 10 MILLIGRAM(S): at 18:23

## 2024-05-06 RX ADMIN — Medication 25 MILLIGRAM(S): at 18:21

## 2024-05-06 RX ADMIN — BUPROPION HYDROCHLORIDE 450 MILLIGRAM(S): 150 TABLET, EXTENDED RELEASE ORAL at 18:21

## 2024-05-06 RX ADMIN — ATORVASTATIN CALCIUM 20 MILLIGRAM(S): 80 TABLET, FILM COATED ORAL at 21:14

## 2024-05-06 RX ADMIN — APIXABAN 5 MILLIGRAM(S): 2.5 TABLET, FILM COATED ORAL at 15:48

## 2024-05-06 RX ADMIN — Medication 20 MILLIGRAM(S): at 18:22

## 2024-05-06 RX ADMIN — Medication 0.6 MILLIGRAM(S): at 15:49

## 2024-05-06 NOTE — PROGRESS NOTE ADULT - SUBJECTIVE AND OBJECTIVE BOX
78-year-old male with TIA, IRAM, AI and long history of paroxysmal atrial fibrillation (on eliquis) but lately has been feeling short of breath. Pt wore an outpatient monitor in 1/2024 which revealed evidence of persistent atrial fibrillation (79% burden). Given the symptoms and high A.fib burden pt was referred to Dr Patricio to assess treatment options.  Pt now presents for elective A.fib ablation with Dr Patricio.  PST performed on 4/30/24.  Pt reports no interval changes.  Pt also reports strict compliance with anticoagulation regiment.       Cardiology summary:    MCOT 1/17/2024: 79% AF/flutter burden (had both) with average HR 77.  ?  ECHO 2/2024:  1. Sigmoid septum without obstruction, EF 60%  2. Hypo basal inferior wall  3. Normal RV/LA/RA  4. Mild AI  ?  ECHO 8/2023:  1. Sigmoid septum without LVOT obstruction  2. EF 55-60%  3. Normal RV/LA/RA  4. Mild AI  ?  PHARM NUCLEAR STRESS 1/2024:  1. Negative for ischemia/infarct, EF 67%  2. Cannot exclude mild apical lateral wall ischemia  3. Normal BP response, resting 106/78 and peak 92/60  4. Noted to be in AF throughout    CARDIAC CATH 2013: 50% LAD stenosis, CALCIUM SCORE 100 in 2013 78-year-old male with TIA, IRAM (does not tolerated CPAP), AI and long history of paroxysmal atrial fibrillation (on eliquis) but lately has been feeling short of breath. Pt wore an outpatient monitor in 1/2024 which revealed evidence of persistent atrial fibrillation (79% burden). Given the symptoms and high A.fib burden pt was referred to Dr Patricio to assess treatment options.  Pt now presents for elective A.fib ablation with Dr Patricio.  PST performed on 4/30/24.  Pt does report that his dental bridge for upper front teeth has become a little loose recently by denies any other interval changes.  Pt also reports strict compliance with anticoagulation regiment with last dose of Eliquis being on last night at 21:00.  NPO confirmed.       Cardiology summary:    MCOT 1/17/2024: 79% AF/flutter burden (had both) with average HR 77.  ?  ECHO 2/2024:  1. Sigmoid septum without obstruction, EF 60%  2. Hypo basal inferior wall  3. Normal RV/LA/RA  4. Mild AI  ?  ECHO 8/2023:  1. Sigmoid septum without LVOT obstruction  2. EF 55-60%  3. Normal RV/LA/RA  4. Mild AI  ?  PHARM NUCLEAR STRESS 1/2024:  1. Negative for ischemia/infarct, EF 67%  2. Cannot exclude mild apical lateral wall ischemia  3. Normal BP response, resting 106/78 and peak 92/60  4. Noted to be in AF throughout    CARDIAC CATH 2013: 50% LAD stenosis, CALCIUM SCORE 100 in 2013

## 2024-05-06 NOTE — DISCHARGE NOTE PROVIDER - NSDCCPTREATMENT_GEN_ALL_CORE_FT
PRINCIPAL PROCEDURE  Procedure: Intracardiac catheter ablation for atrial fibrillation  Findings and Treatment: - Bruising at the groin, sometimes extending down the leg, and/or a small lump under the skin at the groin access site is normal and will resolve within 2 – 3 weeks.   - Occasional skipped beats or palpitations that last for a few beats are common and generally resolve within 1-2 months.   - You make walk and take stairs at a regular pace.   - Do not perform any exercise more strenuous than walking for 1 week.   - Do not strain or lift heavy objects for 1 week.  - You may shower the day after the procedure.  - Do not soak in water (such as tub baths, hot tubs, swimming, etc.) for 1 week.   - You may resume all other activities the day after the procedure.  Call your doctor if:   - you notice bleeding, redness, drainage, swelling, increased tenderness or a hot sensation around the catheter insertion site.   - your temperature is greater than 100 degrees F for more than 24 hours.  - your rapid heart rhythm returns.  - you have any questions or concerns regarding the procedure.  If significant bleeding and/or a large lump (the size of a golf ball or bigger) occurs:  - Lie flat and apply continuous direct pressure just above the puncture site for at least 10 minutes  - If the issue resolves, notify your physician immediately.    - If the bleeding cannot be controlled, please seek immediate medical attention.  If you experience increased difficulty breathing or chest pain, or if you faint or have dizzy spells, please seek immediate medical attention.

## 2024-05-06 NOTE — DISCHARGE NOTE PROVIDER - NSDCMRMEDTOKEN_GEN_ALL_CORE_FT
atorvastatin 20 mg oral tablet: 1 tab(s) orally once a day (at bedtime)  BuPROPion (Eqv-Wellbutrin SR) 150 mg/12 hours oral tablet, extended release: 3 tab(s) orally once a day  doxazosin 2 mg oral tablet: 1 tab(s) orally once a day  Eliquis 5 mg oral tablet: 1 tab(s) orally 2 times a day  FLUoxetine (Eqv-PROzac) 10 mg oral tablet: 1 tab(s) orally once a day  Metoprolol Tartrate 25 mg oral tablet: 1 tab(s) orally 2 times a day   amiodarone 200 mg oral tablet: 2 tab(s) orally 2 times a day x 10 days followed by 1 tab orally daily thereafter.  atorvastatin 20 mg oral tablet: 1 tab(s) orally once a day (at bedtime)  BuPROPion (Eqv-Wellbutrin SR) 150 mg/12 hours oral tablet, extended release: 3 tab(s) orally once a day  colchicine 0.6 mg oral tablet: 0.5 tab(s) orally once a day x 7 days  doxazosin 2 mg oral tablet: 1 tab(s) orally once a day  Eliquis 5 mg oral tablet: 1 tab(s) orally 2 times a day  FLUoxetine (Eqv-PROzac) 10 mg oral tablet: 1 tab(s) orally once a day  Metoprolol Tartrate 25 mg oral tablet: 1 tab(s) orally 2 times a day  pantoprazole 40 mg oral delayed release tablet: 1 tab(s) orally 2 times a day x 14 days followed by 1 tab orally daily x 30 days.  sucralfate 1 g/10 mL oral suspension: 10 milliliter(s) orally 2 times a day x 14 days

## 2024-05-06 NOTE — DISCHARGE NOTE PROVIDER - CARE PROVIDER_API CALL
Nuno Patricio  Cardiac Electrophysiology  [address], [address]  [place] [address]  Phone: [phone]  Fax: [phone]  Established Patient  Follow Up Time: 1 month

## 2024-05-06 NOTE — PROGRESS NOTE ADULT - SUBJECTIVE AND OBJECTIVE BOX
ELECTROPHYSIOLOGY BRIEF POST-OP NOTE    I have personally seen and examined the patient today in cath lab holding area spot 2. No acute post operative complaints     PRE-OP DIAGNOSIS: Persistent atrial fibrillation    POST-OP DIAGNOSIS: SR    PROCEDURE: AFib ablation (PVI + CTI)    COMPLICATIONS: None    I/O: 1604/0cc    Physician: Nuno Patricio MD    ESTIMATED BLOOD LOSS: <10ml    ANESTHESIA TYPE:  [ x ]General Anesthesia  [  ]Sedation  [  ]Local/Regional    CONDITION:  [  ]Critical  [  ]Serious  [ x ]Stable  [  ]Good    FINDINGS: Persistent AFib     EKG: pending    Vital Signs   HR: 67  BP: 107/68  RR: 20  SpO2: 98%     Physical Exam:  Constitutional: NAD, AAOx3  Cardiovascular: +S1S2 RRR  Pulmonary: CTA b/l, unlabored  GI: soft NTND +BS  Extremities: no pedal edema,  Right and left groin: no hematoma or ecchymosis noted.   Neuro: non focal, WALKER x4    A/P  78 year old male with atrial fibrillation, TIA, IRAM and AI admitted with persistent atrial fibrillation noted on outpatient monitor in 1/2024 which revealed evidence of elevated burden (79% burden). Now s/p successful and uncomplicated EPS/RFA of AFib (PVI + CTI) via bilateral LE venous access.     -Bedrest x 4 hours  -start Eliquis 5mg po Q12hr, starting tonight   -start Protonix 40mg PO BID x 14 days followed by 40mg PO QD x 30 days thereafter  -Carafate 1G PO BID x 14 days  -Colchicine 0.6mg x 1 followed by 0.3mg PO QD x 7 days  -Start Amiodarone 400mg PO BID x 14 days followed by 200mg thereafter. Short term use intended  -AM labs and EKG  -Figure 8 sutures to be removed in AM by EP staff  -Discharge planning home tomorrow if stable

## 2024-05-06 NOTE — DISCHARGE NOTE PROVIDER - HOSPITAL COURSE
78 year old male with PMH of atrial fibrillation, TIA, IRAM and AI. He has a long history of paroxysmal atrial fibrillation but lately has been feeling short of breath. He wore an outpatient monitor in 1/2024 which revealed evidence of persistent atrial fibrillation (79% burden) 78 year old male with PMH of atrial fibrillation, TIA, IRAM and AI. He has a long history of paroxysmal atrial fibrillation but lately has been feeling short of breath. He wore an outpatient monitor in 1/2024 which revealed evidence of persistent atrial fibrillation (79% burden) who is now s/p successful ablation (PVI + CTI) of atrial fibrillation

## 2024-05-06 NOTE — DISCHARGE NOTE PROVIDER - NSDCFUADDINST_GEN_ALL_CORE_FT
Follow up with Dr. Patricio in one months time. The office will call you with an appointment in 3-5 days.

## 2024-05-06 NOTE — DISCHARGE NOTE PROVIDER - NSDCFUSCHEDAPPT_GEN_ALL_CORE_FT
Tae Torres Physician [facility]  CARDIOLOGY [address]  Scheduled Appointment: 05/10/2024

## 2024-05-06 NOTE — ASU PATIENT PROFILE, ADULT - PATIENT REPRESENTATIVE: ( YOU CAN CHOOSE ANY PERSON THAT CAN ASSIST YOU WITH YOUR HEALTH CARE PREFERENCES, DOES NOT HAVE TO BE A SPOUSE, IMMEDIATE FAMILY OR SIGNIFICANT OTHER/PARTNER)
March 7, 2024       Gonzales Zavala MD  [address] [facility] [address]  Via In Basket      Patient: Bhumika Fortune   YOB: 1953   Date of Visit: 2/27/2024       Dear Dr. Zavala:    I saw your patient, Bhumika Fortune, for an evaluation. Below are my notes for this visit with her.    If you have questions, please do not hesitate to call me.      Sincerely,        Shell Mas MD        CC: No Recipients    Shlel Mas MD  3/7/2024  9:58 AM  Signed  HPI:   70 year old female coming in with abdominal pain.  This has been going on and off for years.  She has pain in her upper abdomen with some discomfort as well.  It comes and goes.  She has no GI obstructive symptoms.  It always happens after meals.  She has no back pain.  It does not radiate.  It is dull and achy in the upper abdomen.  Last several hours, and then goes away.  She has no fevers or chills.  She is never gone to the emergency room for this.  She otherwise feels well.  She has an outpatient ultrasound that demonstrates stones.  She is here to discuss management.        Review of Systems:  Review of Systems   Constitutional:  Negative for activity change, appetite change, chills, diaphoresis, fatigue, fever and unexpected weight change.   HENT:  Negative for congestion, ear discharge, ear pain, hearing loss, sinus pain, sore throat, trouble swallowing and voice change.    Eyes:  Negative for pain, discharge and redness.   Respiratory:  Negative for cough, chest tightness, shortness of breath, wheezing and stridor.    Cardiovascular:  Negative for chest pain, palpitations and leg swelling.   Gastrointestinal:  Negative for abdominal distention, abdominal pain, blood in stool, constipation, diarrhea, nausea and vomiting.   Endocrine: Negative for cold intolerance, heat intolerance and polyuria.   Genitourinary:  Negative for difficulty urinating, flank pain, frequency and urgency.   Musculoskeletal:  Negative for arthralgias,  back pain, joint swelling and myalgias.   Skin:  Negative for color change, pallor, rash and wound.   Allergic/Immunologic: Negative for environmental allergies and food allergies.   Neurological:  Negative for dizziness, seizures, facial asymmetry, speech difficulty, weakness, light-headedness and headaches.   Hematological:  Negative for adenopathy. Does not bruise/bleed easily.   Psychiatric/Behavioral:  Negative for agitation, confusion and dysphoric mood.        Past Medical History: Reviewed  Past Medical History:   Diagnosis Date   • Allergy    • HLD (hyperlipidemia)    • Tendonitis of ankle        Past Surgical History: Reviewed  Past Surgical History:   Procedure Laterality Date   • Total abdom hysterectomy     • Total hip replacement Right        Current Medication: Reviewed and updated  Current Outpatient Medications   Medication Sig Dispense Refill   • pantoprazole (PROTONIX) 40 MG tablet Take 1 tablet by mouth daily. 90 tablet 0   • atorvastatin (LIPITOR) 20 MG tablet Take 1 tablet by mouth daily. 90 tablet 3     No current facility-administered medications for this visit.       Allergies: Reviewed   ALLERGIES:  Dander, Dust, Mold   (environmental), and Pollen    SOCIAL HISTORY: Reviewed  Social History     Socioeconomic History   • Marital status: Single     Spouse name: Not on file   • Number of children: Not on file   • Years of education: Not on file   • Highest education level: Not on file   Occupational History   • Not on file   Tobacco Use   • Smoking status: Never   • Smokeless tobacco: Never   Substance and Sexual Activity   • Alcohol use: Yes     Comment: socially   • Drug use: Never   • Sexual activity: Not on file   Other Topics Concern   • Not on file   Social History Narrative   • Not on file     Social Determinants of Health     Financial Resource Strain: Not on file   Food Insecurity: Not on file   Transportation Needs: Not on file   Physical Activity: Not on file   Stress: Not on file    Social Connections: Not on file   Interpersonal Safety: Not on file       Family History: Reviewed  Family History   Problem Relation Age of Onset   • Dementia/Alzheimers Mother    • Cancer Father    • Blood Disorder Sister    • Blood Disorder Brother            Physical Exam:  Physical Exam    Imaging Work Up: Reviewed and demonstrated  US ABDOMEN COMPLETE    Result Date: 2/12/2024  Narrative: EXAM: US ABDOMEN COMPLETE CLINICAL INDICATION: Chronic epigastric pain. COMPARISON: None. FINDINGS: The spleen is normal in size and echogenicity. Kidneys are normal in size and echogenicity without hydronephrosis. Visualized pancreas is unremarkable.  Pancreatic tail is not visualized secondary to shadowing from overlying structures. Liver is normal in size with slightly heterogeneous echogenicity, without focal intrahepatic lesion or biliary dilatation. Gallbladder is not visualized and may be decompressed with shadowing from a 1.2 cm calcification, likely a gallstone.  The common bile duct is normal in caliber. Visualized inferior vena cava and abdominal aorta are normal in caliber.     Impression: Slightly heterogeneous hepatic echogenicity without focal hepatic lesion. Gallbladder is not clearly visualized, probably decompressed containing a 1.2 cm stone. Electronically Signed by: BARRY HECTOR M.D. Signed on: 2/12/2024 2:15 PM Workstation ID: [id number]       Assessment:   This is a 70 year old female patient with a new diagnosis of symptomatic cholelithiasis.  It is mild, and not severe.  The only real treatment for this is surgical.  I went over the procedure of a laparoscopic or robotic minimally invasive cholecystectomy.  We reviewed the risks and benefits at length including bleeding, infection, damage to surrounding structures, need for further interventions, and others.  Less common complications such as MI, PE, and death are extremely rare but possible.  she understands.  She does not want to schedule  right now, but if she does in the future, she will call.  She will keep an eye on her symptoms.  We will wait to hear from her.  When she does call, we will schedule her for a robotic removal.  All questions were answered.        Thank you for involving me in her care.    -Shell Mas MD  General Surgery and Surgical Oncology  [facility], [place]     Declines

## 2024-05-06 NOTE — ASU PATIENT PROFILE, ADULT - FALL HARM RISK - UNIVERSAL INTERVENTIONS
Bed in lowest position, wheels locked, appropriate side rails in place/Call bell, personal items and telephone in reach/Instruct patient to call for assistance before getting out of bed or chair/Non-slip footwear when patient is out of bed/Kirk to call system/Physically safe environment - no spills, clutter or unnecessary equipment/Purposeful Proactive Rounding/Room/bathroom lighting operational, light cord in reach

## 2024-05-06 NOTE — PROGRESS NOTE ADULT - SUBJECTIVE AND OBJECTIVE BOX
Admission Criteria  Please admit the patient to the following service: Telemetry 4BKT  Major Criteria:    - Need for adjustment of therapeutic anticoagulation  medications  - Significant volume odad > 200 ml

## 2024-05-07 ENCOUNTER — TRANSCRIPTION ENCOUNTER (OUTPATIENT)
Age: 79
End: 2024-05-07

## 2024-05-07 VITALS
SYSTOLIC BLOOD PRESSURE: 111 MMHG | DIASTOLIC BLOOD PRESSURE: 71 MMHG | TEMPERATURE: 98 F | OXYGEN SATURATION: 98 % | RESPIRATION RATE: 18 BRPM | HEART RATE: 81 BPM

## 2024-05-07 LAB
ANION GAP SERPL CALC-SCNC: 10 MMOL/L — SIGNIFICANT CHANGE UP (ref 5–17)
BUN SERPL-MCNC: 21.9 MG/DL — HIGH (ref 8–20)
CALCIUM SERPL-MCNC: 8.7 MG/DL — SIGNIFICANT CHANGE UP (ref 8.4–10.5)
CHLORIDE SERPL-SCNC: 104 MMOL/L — SIGNIFICANT CHANGE UP (ref 96–108)
CO2 SERPL-SCNC: 24 MMOL/L — SIGNIFICANT CHANGE UP (ref 22–29)
CREAT SERPL-MCNC: 0.94 MG/DL — SIGNIFICANT CHANGE UP (ref 0.5–1.3)
EGFR: 82 ML/MIN/1.73M2 — SIGNIFICANT CHANGE UP
GLUCOSE SERPL-MCNC: 154 MG/DL — HIGH (ref 70–99)
HCT VFR BLD CALC: 34.9 % — LOW (ref 39–50)
HGB BLD-MCNC: 11.3 G/DL — LOW (ref 13–17)
MAGNESIUM SERPL-MCNC: 1.7 MG/DL — SIGNIFICANT CHANGE UP (ref 1.6–2.6)
MCHC RBC-ENTMCNC: 27.4 PG — SIGNIFICANT CHANGE UP (ref 27–34)
MCHC RBC-ENTMCNC: 32.4 GM/DL — SIGNIFICANT CHANGE UP (ref 32–36)
MCV RBC AUTO: 84.7 FL — SIGNIFICANT CHANGE UP (ref 80–100)
PLATELET # BLD AUTO: 172 K/UL — SIGNIFICANT CHANGE UP (ref 150–400)
POTASSIUM SERPL-MCNC: 4 MMOL/L — SIGNIFICANT CHANGE UP (ref 3.5–5.3)
POTASSIUM SERPL-SCNC: 4 MMOL/L — SIGNIFICANT CHANGE UP (ref 3.5–5.3)
RBC # BLD: 4.12 M/UL — LOW (ref 4.2–5.8)
RBC # FLD: 15.4 % — HIGH (ref 10.3–14.5)
SODIUM SERPL-SCNC: 138 MMOL/L — SIGNIFICANT CHANGE UP (ref 135–145)
WBC # BLD: 9.57 K/UL — SIGNIFICANT CHANGE UP (ref 3.8–10.5)
WBC # FLD AUTO: 9.57 K/UL — SIGNIFICANT CHANGE UP (ref 3.8–10.5)

## 2024-05-07 PROCEDURE — 86901 BLOOD TYPING SEROLOGIC RH(D): CPT

## 2024-05-07 PROCEDURE — 93010 ELECTROCARDIOGRAM REPORT: CPT

## 2024-05-07 PROCEDURE — 86900 BLOOD TYPING SEROLOGIC ABO: CPT

## 2024-05-07 PROCEDURE — 36415 COLL VENOUS BLD VENIPUNCTURE: CPT

## 2024-05-07 PROCEDURE — 80048 BASIC METABOLIC PNL TOTAL CA: CPT

## 2024-05-07 PROCEDURE — C1759: CPT

## 2024-05-07 PROCEDURE — 83735 ASSAY OF MAGNESIUM: CPT

## 2024-05-07 PROCEDURE — C9399: CPT

## 2024-05-07 PROCEDURE — 93656 COMPRE EP EVAL ABLTJ ATR FIB: CPT

## 2024-05-07 PROCEDURE — 93005 ELECTROCARDIOGRAM TRACING: CPT

## 2024-05-07 PROCEDURE — 85027 COMPLETE CBC AUTOMATED: CPT

## 2024-05-07 PROCEDURE — C1887: CPT

## 2024-05-07 PROCEDURE — C1894: CPT

## 2024-05-07 PROCEDURE — 93655 ICAR CATH ABLTJ DSCRT ARRHYT: CPT

## 2024-05-07 PROCEDURE — C1730: CPT

## 2024-05-07 PROCEDURE — C1732: CPT

## 2024-05-07 PROCEDURE — 86850 RBC ANTIBODY SCREEN: CPT

## 2024-05-07 RX ORDER — SUCRALFATE 1 G
10 TABLET ORAL
Qty: 280 | Refills: 0
Start: 2024-05-07 | End: 2024-05-20

## 2024-05-07 RX ORDER — COLCHICINE 0.6 MG
0.5 TABLET ORAL
Qty: 3.5 | Refills: 0
Start: 2024-05-07 | End: 2024-05-13

## 2024-05-07 RX ORDER — MAGNESIUM SULFATE 500 MG/ML
2 VIAL (ML) INJECTION ONCE
Refills: 0 | Status: COMPLETED | OUTPATIENT
Start: 2024-05-07 | End: 2024-05-07

## 2024-05-07 RX ORDER — PANTOPRAZOLE SODIUM 20 MG/1
1 TABLET, DELAYED RELEASE ORAL
Qty: 58 | Refills: 0
Start: 2024-05-07 | End: 2024-05-20

## 2024-05-07 RX ORDER — AMIODARONE HYDROCHLORIDE 400 MG/1
2 TABLET ORAL
Qty: 90 | Refills: 0
Start: 2024-05-07 | End: 2024-05-16

## 2024-05-07 RX ADMIN — Medication 650 MILLIGRAM(S): at 09:10

## 2024-05-07 RX ADMIN — Medication 25 MILLIGRAM(S): at 05:40

## 2024-05-07 RX ADMIN — Medication 1 GRAM(S): at 05:38

## 2024-05-07 RX ADMIN — Medication 25 GRAM(S): at 08:39

## 2024-05-07 RX ADMIN — PANTOPRAZOLE SODIUM 40 MILLIGRAM(S): 20 TABLET, DELAYED RELEASE ORAL at 05:38

## 2024-05-07 RX ADMIN — Medication 650 MILLIGRAM(S): at 08:10

## 2024-05-07 RX ADMIN — APIXABAN 5 MILLIGRAM(S): 2.5 TABLET, FILM COATED ORAL at 04:40

## 2024-05-07 RX ADMIN — AMIODARONE HYDROCHLORIDE 400 MILLIGRAM(S): 400 TABLET ORAL at 05:40

## 2024-05-07 NOTE — PROGRESS NOTE ADULT - SUBJECTIVE AND OBJECTIVE BOX
Patient seen today in bed. Figure 8 sutures removed from right and left groin without complication. No overnight complaints. Magnesium supplemented.     EKG: SR at 65bpm; QRSD 98ms  TELE: SR. No events    MEDICATIONS  (STANDING):  aMIOdarone    Tablet 400 milliGRAM(s) Oral two times a day  apixaban 5 milliGRAM(s) Oral every 12 hours  atorvastatin 20 milliGRAM(s) Oral at bedtime  buPROPion XL (24-Hour) . 450 milliGRAM(s) Oral daily  colchicine 0.3 milliGRAM(s) Oral daily  doxazosin 2 milliGRAM(s) Oral at bedtime  FLUoxetine 10 milliGRAM(s) Oral daily  metoprolol tartrate 25 milliGRAM(s) Oral two times a day  pantoprazole    Tablet 40 milliGRAM(s) Oral two times a day  sucralfate suspension 1 Gram(s) Oral two times a day    MEDICATIONS  (PRN):  acetaminophen     Tablet .. 650 milliGRAM(s) Oral every 6 hours PRN Mild Pain (1 - 3)  aluminum hydroxide/magnesium hydroxide/simethicone Suspension 30 milliLiter(s) Oral every 4 hours PRN Dyspepsia  benzocaine/menthol Lozenge 1 Lozenge Oral every 6 hours PRN Sore Throat  oxyCODONE    IR 10 milliGRAM(s) Oral every 6 hours PRN Moderate Pain (4 - 6)    Allereies  No Known Allergies    PAST MEDICAL & SURGICAL HISTORY:  Chronic atrial fibrillation  H/O TIA (transient ischemic attack) and stroke  IRAM (obstructive sleep apnea)  Anxiety and depression  H/O foot surgery  H/O total shoulder replacement, right    Vital Signs Last 24 Hrs  T(C): 36.4 (07 May 2024 08:26), Max: 36.6 (06 May 2024 21:16)  T(F): 97.6 (07 May 2024 08:26), Max: 97.9 (07 May 2024 00:14)  HR: 66 (07 May 2024 08:26) (66 - 82)  BP: 109/68 (07 May 2024 08:26) (96/55 - 116/67)  BP(mean): 81 (06 May 2024 21:16) (81 - 84)  RR: 16 (07 May 2024 08:26) (16 - 20)  SpO2: 97% (07 May 2024 08:26) (94% - 100%)    Physical Exam:  Constitutional: NAD, AAOx3  Cardiovascular: +S1S2 RRR  Pulmonary: CTA b/l, unlabored  GI: soft NTND +BS  Extremities: no pedal edema,   Right and left groin: No hematoma or ecchymosis   Neuro: non focal, WALKER x4    LABS:                        11.3   9.57  )-----------( 172      ( 07 May 2024 05:01 )             34.9     138  |  104  |  21.9<H>  ----------------------------<  154<H>  4.0   |  24.0  |  0.94  Ca    8.7      07 May 2024 05:01  Mg     1.7     05-07    A/P  78 year old male with atrial fibrillation, TIA, IRAM and AI admitted with persistent atrial fibrillation noted on outpatient monitor in 1/2024 which revealed evidence of elevated burden (79% burden). Now POD#1 s/p successful and uncomplicated EPS/RFA of AFib (PVI + CTI) via bilateral LE venous access.     - Rx sent to pharmacy  - Discharge home today

## 2024-05-07 NOTE — DISCHARGE NOTE NURSING/CASE MANAGEMENT/SOCIAL WORK - PATIENT PORTAL LINK FT
You can access the FollowMyHealth Patient Portal offered by [facility] by registering at the following website: [email or web address]. By joining [facility]’s FollowMyHealth portal, you will also be able to view your health information using other applications (apps) compatible with our system.

## 2024-05-10 ENCOUNTER — TRANSCRIPTION ENCOUNTER (OUTPATIENT)
Age: 79
End: 2024-05-10

## 2024-05-10 ENCOUNTER — NON-APPOINTMENT (OUTPATIENT)
Age: 79
End: 2024-05-10

## 2024-05-10 ENCOUNTER — APPOINTMENT (OUTPATIENT)
Dept: CARDIOLOGY | Facility: CLINIC | Age: 79
End: 2024-05-10
Payer: MEDICARE

## 2024-05-10 VITALS
SYSTOLIC BLOOD PRESSURE: 104 MMHG | RESPIRATION RATE: 16 BRPM | DIASTOLIC BLOOD PRESSURE: 64 MMHG | BODY MASS INDEX: 33.46 KG/M2 | HEIGHT: 72 IN | WEIGHT: 247 LBS

## 2024-05-10 VITALS — HEART RATE: 70 BPM

## 2024-05-10 DIAGNOSIS — I65.29 OCCLUSION AND STENOSIS OF UNSPECIFIED CAROTID ARTERY: ICD-10-CM

## 2024-05-10 DIAGNOSIS — G47.33 OBSTRUCTIVE SLEEP APNEA (ADULT) (PEDIATRIC): ICD-10-CM

## 2024-05-10 DIAGNOSIS — I25.10 ATHEROSCLEROTIC HEART DISEASE OF NATIVE CORONARY ARTERY W/OUT ANGINA PECTORIS: ICD-10-CM

## 2024-05-10 DIAGNOSIS — R93.1 ABNORMAL FINDINGS ON DIAGNOSTIC IMAGING OF HEART AND CORONARY CIRCULATION: ICD-10-CM

## 2024-05-10 DIAGNOSIS — I10 ESSENTIAL (PRIMARY) HYPERTENSION: ICD-10-CM

## 2024-05-10 PROBLEM — F41.9 ANXIETY DISORDER, UNSPECIFIED: Chronic | Status: ACTIVE | Noted: 2024-04-30

## 2024-05-10 PROBLEM — Z86.73 PERSONAL HISTORY OF TRANSIENT ISCHEMIC ATTACK (TIA), AND CEREBRAL INFARCTION WITHOUT RESIDUAL DEFICITS: Chronic | Status: ACTIVE | Noted: 2024-04-30

## 2024-05-10 PROBLEM — I48.20 CHRONIC ATRIAL FIBRILLATION, UNSPECIFIED: Chronic | Status: ACTIVE | Noted: 2024-04-30

## 2024-05-10 PROCEDURE — G2211 COMPLEX E/M VISIT ADD ON: CPT

## 2024-05-10 PROCEDURE — 99214 OFFICE O/P EST MOD 30 MIN: CPT

## 2024-05-10 PROCEDURE — 93000 ELECTROCARDIOGRAM COMPLETE: CPT

## 2024-05-10 RX ORDER — FLUOXETINE HYDROCHLORIDE 10 MG/1
10 CAPSULE ORAL DAILY
Refills: 0 | Status: ACTIVE | COMMUNITY

## 2024-05-10 RX ORDER — AMIODARONE HYDROCHLORIDE 200 MG/1
200 TABLET ORAL DAILY
Refills: 0 | Status: ACTIVE | COMMUNITY

## 2024-05-10 NOTE — HISTORY OF PRESENT ILLNESS
[FreeTextEntry1] : Patient is a 79yo M with PAF, TIA, non-obstructive CAD, IRAM, AI here for cardiac follow up. Metoprolol cut back last office visit as BP low.  States not feeling well past few weeks. Notes dyspnea but not always with exertion, does feel with walking but not always.  Mornings feels diaphoretic. Chronic non exertional CP for years. No PND/orthopnea/edema/palps/syncope. Gained some weight. Mood is better on FLuoxetine now. Given symptoms and noted to be back in AF, underwent further cardiac work up after last OV. MCOT/echo/stress testing done  Notes some pain in left arm/shoulder. Doesnt feel  palps since last OV. Sometimes dyspneic.   Has 2 year old granddaughter, has total of 10. Has 3 kids and 2 stepsons  ROS: GI and  negative

## 2024-05-10 NOTE — DISCUSSION/SUMMARY
[FreeTextEntry1] : Patient is a 77yo M with PAF, TIA, non-obstructive CAD, IRAM (not on CPAP anymore), AI here for follow up  -Minimal plaque on carotid duplex in 2019 -5/2022: KAREN/PVR normal   -Echo 2/2024 with normal function and stable mild AI. Basal inferior wall hypo but pharm nuclear stress 1/2024 negative and no RWMA/signs of infarct. Cannot exclude apical lateral wall ischemia but significant artifact and dose not correlate with echo WMA. No symptoms to suggest ischemia now and will plan med managment -Lipids at goal 1/2024, normal BNP as well , TSH 2.6  -1/17/2024: MCOT reviewed, 79% AF/flutter burden (had both) with average HR 77. Referred to EP and now s/p ablation 5/2024   -Still with dyspnea/fatigue, recommend pulm evaluation and treatment of his IRAM  1. Pulm evaluation for continued dyspnea and known IRAM. Seen Dr Amaro many years back 2. Lipids at goal 1/2024, med management of CAD 3. Continue anticoagulation for atrial fibrillation thromboembolic prophylaxis and rate control strategy 4.  Repeat echo later this year given RWMA inferior wall of ? significance and to evaluate post AF ablation 5. Recommend aggressive diet and lifestyle modifications , counselled on weight loss  6. Follow up 3months  [EKG obtained to assist in diagnosis and management of assessed problem(s)] : EKG obtained to assist in diagnosis and management of assessed problem(s)

## 2024-05-10 NOTE — PHYSICAL EXAM
[General Appearance - Well Nourished] : well nourished [General Appearance - Well Developed] : well developed [General Appearance - In No Acute Distress] : no acute distress [Normal Conjunctiva] : the conjunctiva exhibited no abnormalities [Normal Oral Mucosa] : normal oral mucosa [Normal Jugular Venous V Waves Present] : normal jugular venous V waves present [Respiration, Rhythm And Depth] : normal respiratory rhythm and effort [Auscultation Breath Sounds / Voice Sounds] : lungs were clear to auscultation bilaterally [Heart Sounds] : normal S1 and S2 [Murmurs] : no murmurs present [Abdomen Soft] : soft [Abdomen Tenderness] : non-tender [] : no hepato-splenomegaly [Abdomen Mass (___ Cm)] : no abdominal mass palpated [Abnormal Walk] : normal gait [Nail Clubbing] : no clubbing of the fingernails [Cyanosis, Localized] : no localized cyanosis [Skin Color & Pigmentation] : normal skin color and pigmentation [Oriented To Time, Place, And Person] : oriented to person, place, and time [Affect] : the affect was normal [FreeTextEntry1] : obese Quality 130: Documentation Of Current Medications In The Medical Record: Current Medications Documented Detail Level: Detailed

## 2024-05-10 NOTE — ASSESSMENT
[FreeTextEntry1] : ECG: SR, 1st degree AV delay, LAFB    HDL 58 LDL 64 TG 80 (1/2024)   HDL 51 LDL 59 TG 49 (2/2023)  HDL 72 LDL 69 TG 60 (6/2020)  HDL 51 LDL 59 TG 70 (12/2018) A1C 5.8 (1/2024)  (1/2024)    1/17/2024: MCOT reviewed, 79% AF/flutter burden (had both) with average HR 77.   ECHO 2/2024: 1. Sigmoid septum without obstruction, EF 60% 2. Hypo basal inferior wall 3. Normal RV/LA/RA  4. Mild AI   ECHO 8/2023: 1. Sigmoid septum without LVOT obstruction 2. EF 55-60% 3. Normal RV/LA/RA 4. Mild AI  PHARM NUCLEAR STRESS 1/2024: 1. Negative for ischemia/infarct, EF 67% 2. Cannot exclude mild apical lateral wall ischemia 3. Normal BP response, resting 106/78 and peak 92/60 4. Noted to be in AF throughout  PHARM NUCLEAR STRESS 5/2022: 1. Negative for ischemia/infarct, EF 62% 2. Normal HR/BP response  KAREN/PVR 5/2022: 1. R 1.23 L 1.27 2. Normal PVR  ECHO 10/2021: 1. Normal LV size, systolic and diastolic function. EF 55-60%, sigmoid septum. Contrast used 2. Normal RV/LA/RA 3. Mild AI  CAROTID 4/2019: 1. All arteries were clearly visualized. 2. No hemodynamically significant stenosis is noted in the left ICA. 3. No hemodynamically significant stenosis is noted in the right ICA. 4. There is antegrade flow in the right and left vertebral arteries.  ECHO 4/2019: 1. Sigmoid septum without evidence of obstruction. 2. Normal left ventricular size and wall thickness, with normal systolic and diastolic function. 3. Left ventricular ejection fraction, by visual estimation, is 55 to 60%. 4. Normal RV/LA/RA 5. Mild to moderate posterior mitral annular calcification. 6. Mild aortic regurgitation.  PHARM NUCLEAR STRESS TEST 4/2019: 1. Normal Sestamibi myocardial perfusion SPECT imaging. 2. Left ventricular function was normal with an EF of 61%. 3. The EKG was negative for ischemia. 4. The patient developed no dysrhythmias during stress. 5. The blood pressure response was normal.   CARDIAC CATH 2013: 50% LAD stenosis, CALCIUM SCORE 100 in 2013 AORTIC DUPLEX 2013: Negative for aneurysm.

## 2024-06-07 ENCOUNTER — NON-APPOINTMENT (OUTPATIENT)
Age: 79
End: 2024-06-07

## 2024-06-25 ENCOUNTER — APPOINTMENT (OUTPATIENT)
Dept: ELECTROPHYSIOLOGY | Facility: CLINIC | Age: 79
End: 2024-06-25
Payer: MEDICARE

## 2024-06-25 ENCOUNTER — NON-APPOINTMENT (OUTPATIENT)
Age: 79
End: 2024-06-25

## 2024-06-25 VITALS
HEART RATE: 62 BPM | WEIGHT: 258 LBS | BODY MASS INDEX: 34.95 KG/M2 | DIASTOLIC BLOOD PRESSURE: 65 MMHG | HEIGHT: 72 IN | SYSTOLIC BLOOD PRESSURE: 109 MMHG | OXYGEN SATURATION: 97 %

## 2024-06-25 DIAGNOSIS — Z86.79 OTHER SPECIFIED POSTPROCEDURAL STATES: ICD-10-CM

## 2024-06-25 DIAGNOSIS — Z98.890 OTHER SPECIFIED POSTPROCEDURAL STATES: ICD-10-CM

## 2024-06-25 DIAGNOSIS — I48.91 UNSPECIFIED ATRIAL FIBRILLATION: ICD-10-CM

## 2024-06-25 PROCEDURE — 93000 ELECTROCARDIOGRAM COMPLETE: CPT

## 2024-06-25 PROCEDURE — 99214 OFFICE O/P EST MOD 30 MIN: CPT

## 2024-06-25 RX ORDER — PANTOPRAZOLE 40 MG/1
40 TABLET, DELAYED RELEASE ORAL DAILY
Qty: 90 | Refills: 1 | Status: DISCONTINUED | COMMUNITY
End: 2024-06-25

## 2024-06-25 RX ORDER — COLCHICINE 0.6 MG/1
0.6 TABLET ORAL
Refills: 0 | Status: DISCONTINUED | COMMUNITY
End: 2024-06-25

## 2024-06-25 RX ORDER — AMIODARONE HYDROCHLORIDE 400 MG/1
400 TABLET ORAL TWICE DAILY
Refills: 0 | Status: DISCONTINUED | COMMUNITY
End: 2024-06-25

## 2024-06-25 RX ORDER — SUCRALFATE 1 G/10ML
1 SUSPENSION ORAL
Refills: 0 | Status: DISCONTINUED | COMMUNITY
End: 2024-06-25

## 2024-07-05 ENCOUNTER — RX RENEWAL (OUTPATIENT)
Age: 79
End: 2024-07-05

## 2024-07-30 ENCOUNTER — APPOINTMENT (OUTPATIENT)
Dept: CARDIOLOGY | Facility: CLINIC | Age: 79
End: 2024-07-30
Payer: MEDICARE

## 2024-07-30 PROCEDURE — 93306 TTE W/DOPPLER COMPLETE: CPT

## 2024-08-02 ENCOUNTER — APPOINTMENT (OUTPATIENT)
Dept: PULMONOLOGY | Facility: CLINIC | Age: 79
End: 2024-08-02
Payer: MEDICARE

## 2024-08-02 VITALS
RESPIRATION RATE: 16 BRPM | WEIGHT: 254 LBS | OXYGEN SATURATION: 96 % | HEIGHT: 69 IN | SYSTOLIC BLOOD PRESSURE: 120 MMHG | DIASTOLIC BLOOD PRESSURE: 74 MMHG | BODY MASS INDEX: 37.62 KG/M2 | HEART RATE: 70 BPM

## 2024-08-02 DIAGNOSIS — G47.33 OBSTRUCTIVE SLEEP APNEA (ADULT) (PEDIATRIC): ICD-10-CM

## 2024-08-02 DIAGNOSIS — E66.9 OBESITY, UNSPECIFIED: ICD-10-CM

## 2024-08-02 PROCEDURE — 99204 OFFICE O/P NEW MOD 45 MIN: CPT

## 2024-08-02 PROCEDURE — G2211 COMPLEX E/M VISIT ADD ON: CPT

## 2024-08-02 NOTE — CONSULT LETTER
[Dear  ___] : Dear  [unfilled], [Courtesy Letter:] : I had the pleasure of seeing your patient, [unfilled], in my office today. [Consult Closing:] : Thank you very much for allowing me to participate in the care of this patient.  If you have any questions, please do not hesitate to contact me. [DrHannah  ___] : Dr. RETANA [Jose Antonio Amaro MD] : Jose Antonio Amaro MD

## 2024-08-02 NOTE — REVIEW OF SYSTEMS
[Obesity] : obesity [Depression] : depression [Negative] : Musculoskeletal [FreeTextEntry3] : per HPI [FreeTextEntry8] : per HPI [FreeTextEntry9] : hx AFib

## 2024-08-02 NOTE — HISTORY OF PRESENT ILLNESS
[Obstructive Sleep Apnea] : obstructive sleep apnea [Snoring] : snoring [Frequent Nocturnal Awakening] : frequent nocturnal awakening [Daytime Somnolence] : daytime somnolence [ESS: ___] : ESS score [unfilled] [Unintentional Sleep While Inactive] : unintentional sleep while inactive [Awakes Unrefreshed] : awakening unrefreshed [AHI: ___ per hour] : Apnea-hypopnea index:  [unfilled] per hour [Date: ___] : the most recent therapeutic polysomnogram was completed [unfilled] [CPAP: ___ cmH2O] : CPAP: [unfilled] cmH2O [% Days used: ____] : Days used: [unfilled] % [% Days used > 4 hrs: ____] : Days used > 4 hrs: [unfilled] % [Mean nightly usage: ___ hrs] : Mean nightly usage: [unfilled] hrs [___ min(s)] : [unfilled] min(s) [Therapy based AHI: ___ /hr] : Therapy based AHI: [unfilled] / hr [Former] : former [Unintentional Sleep while Active] : no unintentional sleep while active [TextBox_4] : Hx IRAM, afib.   Last seen here in 2017.  For IRAM was on CPAP 7. On last visit, compliance excellent. Therapeutic AHI was less than 2. He reports he stopped using it about 3 years ago. Did not like using it any more. He is not sure what exactly happened.    S/P catheter ablation for afib on 5/6/24. On eliquis. Amio. Plan on stopping amio.   Presents today with c/o VILLANUEVA for about the past few years. No c/o it when seen in 2017. VILLANUEVA with heavy exertion only. SOB bending over.   Feels thick phlegm in throat. Clearing throat all the time. On last visit here in 2017, c/o  a lot of nasal congestion.  Gained about 20 lbs since last visit here in 2017.   Stopped smoking cigarettes >35 y/a. Occ cigars.

## 2024-08-02 NOTE — PROCEDURE
[FreeTextEntry1] :  Date of Exam: 07/12/2024 LINETTE Campuzano Name: Gaye Santa LINETTE Campuzano Address: [address] [place] [address], [facility] RHannah Phys. Phone: [phone] EXAM: CTA-CHEST POST IV CONTRAST  HISTORY: Prominent aorta seen on chest x-ray   TECHNIQUE: Axial images through the chest were obtained. Coronal and sagittal images were reconstructed at 3 mm increments. MIP images as well as 3-D volume rendered images were obtained on a separate workstation and available for interpretation. Contrast dose: 85cc Omnipaque 350 This study was performed using automatic exposure control and an iterative reconstruction technique (radiation dose reduction software) to obtain a diagnostic image quality scan with patient dose as low as reasonably achievable. The mA and kV were adjusted according to patient size. The administered radiation dose was 16.226mSv. Per ACR guidelines and [facility] policy, a creatinine level test was performed prior to this exam. Results were as follows: Creatinine 1.3 mg/dL .  COMPARISON: Chest x-ray 7/2/2020  FINDINGS:  THYROID: The thyroid is unremarkable. CHEST WALL/SOFT TISSUES: Moderate bilateral gynecomastia HEART AND PERICARDIUM: The heart is not enlarged. No pericardial effusion. There are extensive coronary artery calcifications.. AORTA/PULMONARY ARTERIES: The right and left main pulmonary arteries are patent. Limited evaluation of the more distal vessels. Aorta is normal caliber without aneurysm or dissection. ADENOPATHY: None. LUNGS: Minimal subpleural fibrotic changes in the periphery of both lungs. No suspicious nodules or focal infiltrates The visualized airways are patent PLEURA: No pleural effusion  LIMITED VIEWS OF THE UPPER ABDOMEN: Partially visualized right renal cyst.  OSSEOUS STRUCTURES: There is a right shoulder prosthesis. Mild to moderate degenerative changes of the spine.  IMPRESSION:   No evidence for aortic aneurysm or dissection  Additional findings as above  Signed by: Sharlene Reyes MD Signed Date: 7/12/2024 1:25 PM EDT    SIGNED BY: Sharlene Reyes M.D., Ext. [phone] 07/12/2024 01:25 PM -----------  CXR done 6/4/24 reviewed report

## 2024-08-02 NOTE — PLAN
[TextEntry] : PFT to be done. Trial of nasal saline which is OTC. ENT eval. Repeat sleep study and get new cpap. Reviewed with the patient the short and long term health consequences of untreated IRAM. Risk include, but not limited to, HTN, heart disease, stroke, MVAs, further issues with afib. Cardiology f/u. Weight loss. Exercise. Annual flu vaccine. Further reccs will come.

## 2024-08-02 NOTE — PHYSICAL EXAM
[General Appearance - In No Acute Distress] : no acute distress [Normal Conjunctiva] : the conjunctiva exhibited no abnormalities [Low Lying Soft Palate] : low lying soft palate [Elongated Uvula] : elongated uvula [Enlarged Base of the Tongue] : enlargement of the base of the tongue [III] : III [Heart Rate And Rhythm] : heart rate was normal and rhythm regular [Respiration, Rhythm And Depth] : normal respiratory rhythm and effort [Exaggerated Use Of Accessory Muscles For Inspiration] : no accessory muscle use [Auscultation Breath Sounds / Voice Sounds] : lungs were clear to auscultation bilaterally [Abnormal Walk] : normal gait [Musculoskeletal - Swelling] : no joint swelling seen [Nail Clubbing] : no clubbing of the fingernails [Cyanosis, Localized] : no localized cyanosis [Skin Color & Pigmentation] : normal skin color and pigmentation [Cranial Nerves] : cranial nerves 2-12 were intact [No Focal Deficits] : no focal deficits [Oriented To Time, Place, And Person] : oriented to person, place, and time [Impaired Insight] : insight and judgment were intact [Affect] : the affect was normal [Neck Appearance] : the appearance of the neck was normal [] : the neck was supple [Jugular Venous Distention Increased] : there was no jugular-venous distention [Neck Circumference: ___] : neck circumference is [unfilled] [Normal Oropharynx] : abnormal oropharynx

## 2024-08-07 ENCOUNTER — APPOINTMENT (OUTPATIENT)
Dept: CARDIOLOGY | Facility: CLINIC | Age: 79
End: 2024-08-07

## 2024-08-07 ENCOUNTER — NON-APPOINTMENT (OUTPATIENT)
Age: 79
End: 2024-08-07

## 2024-08-07 PROBLEM — M62.89 EXERCISE-INDUCED LEG FATIGUE: Status: ACTIVE | Noted: 2024-08-07

## 2024-08-07 PROBLEM — R06.02 SOB (SHORTNESS OF BREATH) ON EXERTION: Status: ACTIVE | Noted: 2024-08-07

## 2024-08-07 PROCEDURE — 99215 OFFICE O/P EST HI 40 MIN: CPT

## 2024-08-07 PROCEDURE — G2211 COMPLEX E/M VISIT ADD ON: CPT

## 2024-08-07 PROCEDURE — 93000 ELECTROCARDIOGRAM COMPLETE: CPT

## 2024-08-07 NOTE — ASSESSMENT
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   ECHO 2/2024: 1. Sigmoid septum without obstruction, EF 60% 2. Hypo basal inferior wall 3. Normal RV/LA/RA  4. Mild AI   ECHO 8/2023: 1. Sigmoid septum without LVOT obstruction 2. EF 55-60% 3. Normal RV/LA/RA 4. Mild AI  PHARM NUCLEAR STRESS 1/2024: 1. Negative for ischemia/infarct, EF 67% 2. Cannot exclude mild apical lateral wall ischemia 3. Normal BP response, resting 106/78 and peak 92/60 4. Noted to be in AF throughout  PHARM NUCLEAR STRESS 5/2022: 1. Negative for ischemia/infarct, EF 62% 2. Normal HR/BP response  KAREN/PVR 5/2022: 1. R 1.23 L 1.27 2. Normal PVR  ECHO 10/2021: 1. Normal LV size, systolic and diastolic function. EF 55-60%, sigmoid septum. Contrast used 2. Normal RV/LA/RA 3. Mild AI  CAROTID 4/2019: 1. All arteries were clearly visualized. 2. No hemodynamically significant stenosis is noted in the left ICA. 3. No hemodynamically significant stenosis is noted in the right ICA. 4. There is antegrade flow in the right and left vertebral arteries.  ECHO 4/2019: 1. Sigmoid septum without evidence of obstruction. 2. Normal left ventricular size and wall thickness, with normal systolic and diastolic function. 3. Left ventricular ejection fraction, by visual estimation, is 55 to 60%. 4. Normal RV/LA/RA 5. Mild to moderate posterior mitral annular calcification. 6. Mild aortic regurgitation.  PHARM NUCLEAR STRESS TEST 4/2019: 1. Normal Sestamibi myocardial perfusion SPECT imaging. 2. Left ventricular function was normal with an EF of 61%. 3. The EKG was negative for ischemia. 4. The patient developed no dysrhythmias during stress. 5. The blood pressure response was normal.   CARDIAC CATH 2013: 50% LAD stenosis, CALCIUM SCORE 100 in 2013 AORTIC DUPLEX 2013: Negative for aneurysm.

## 2024-08-07 NOTE — DISCUSSION/SUMMARY
[EKG obtained to assist in diagnosis and management of assessed problem(s)] : EKG obtained to assist in diagnosis and management of assessed problem(s) [FreeTextEntry1] : Patient is a 80yo M with PAF, TIA, non-obstructive CAD, IRAM (not on CPAP anymore), AI here for follow up     -Echo 2/2024 with normal function and stable mild AI. Basal inferior wall hypo but pharm nuclear stress 1/2024 negative and no RWMA/signs of infarct. Cannot exclude apical lateral wall ischemia but significant artifact and dose not correlate with echo WMA. Repeat echo 7/2024 essentially unchanged with persistent inferior wall hypokinesis.  -CTA chest recently with clear lungs and extensive coronary calcifiations -Lipids at goal 1/2024, normal BNP as well , TSH 2.6  -1/17/2024: MCOT reviewed, 79% AF/flutter burden (had both) with average HR 77. Referred to EP and now s/p ablation 5/2024 .   -Still with dyspnea/fatigue, maybe still related to weight/deconditioning/age. given persistent WMA and extensive CAC on chest CT despite negative nuke will plan LHC to better evaluate. Rare atypical CP. Also seen by Dr Raines   1. Pulm follow up  for continued dyspnea and known IRAM.  2. Lipids at goal 1/2024, continue statin 3. Continue anticoagulation for atrial fibrillation 4.  EP follow up s/p ablation, planned for further event monitoring and just obtained today in mail 5. Recommend aggressive diet and lifestyle modifications , counselled on weight loss  6. Plan for LHC as noted above and follow up after 7. Also wiht leg fatigue/heaviness, will arrange PAD eval with KAREN/LE duplex

## 2024-08-07 NOTE — HISTORY OF PRESENT ILLNESS
[FreeTextEntry1] : Patient is a 80yo M with PAF, TIA, non-obstructive CAD, IRAM, AI here for cardiac follow up. Continues to feel dyspnea particularly up stairs. No chest pain, feels short of breath when bends over and notes phlegm in throat frequently. Patient denies PND/orthopnea/edema/palpitations/syncope/claudication. HAd recent echo and CTA chest, also seen by pulmonary.   Has 2 year old granddaughter, has total of 10. Has 3 kids and 2 stepsons  ROS: GI and  negative

## 2024-08-12 ENCOUNTER — APPOINTMENT (OUTPATIENT)
Dept: CARDIOLOGY | Facility: CLINIC | Age: 79
End: 2024-08-12
Payer: MEDICARE

## 2024-08-12 PROCEDURE — 93923 UPR/LXTR ART STDY 3+ LVLS: CPT

## 2024-08-16 ENCOUNTER — TRANSCRIPTION ENCOUNTER (OUTPATIENT)
Age: 79
End: 2024-08-16

## 2024-08-16 ENCOUNTER — RX RENEWAL (OUTPATIENT)
Age: 79
End: 2024-08-16

## 2024-08-21 ENCOUNTER — APPOINTMENT (OUTPATIENT)
Dept: OTOLARYNGOLOGY | Facility: CLINIC | Age: 79
End: 2024-08-21
Payer: MEDICARE

## 2024-08-21 VITALS
DIASTOLIC BLOOD PRESSURE: 60 MMHG | HEIGHT: 69 IN | WEIGHT: 255 LBS | HEART RATE: 66 BPM | BODY MASS INDEX: 37.77 KG/M2 | SYSTOLIC BLOOD PRESSURE: 92 MMHG

## 2024-08-21 DIAGNOSIS — R09.89 OTHER SPECIFIED SYMPTOMS AND SIGNS INVOLVING THE CIRCULATORY AND RESPIRATORY SYSTEMS: ICD-10-CM

## 2024-08-21 DIAGNOSIS — R09.A2 FOREIGN BODY SENSATION, THROAT: ICD-10-CM

## 2024-08-21 PROCEDURE — 31575 DIAGNOSTIC LARYNGOSCOPY: CPT

## 2024-08-21 PROCEDURE — 99204 OFFICE O/P NEW MOD 45 MIN: CPT | Mod: 25

## 2024-08-21 NOTE — HISTORY OF PRESENT ILLNESS
[de-identified] : 79 year old male here for globus and thick mucus in his throat for several months. Pt has been seen by pulmonologist Dr. Amaro with chest xray and chest CT obtained showing clear lungs, referred to ENT. Pt is able to expectorate thick white mucus that patient states he feels as if it is sitting in his throat with dyspnea at times exacerbated with postural changes. Denies dysphagia, odynophagia, cough.

## 2024-08-21 NOTE — ASSESSMENT
[FreeTextEntry1] : Pt with no ENT source for his thickened secretions.  Pt advised to increase hydration slight to see if this helps.  Pt's Doxazosin is known to cause xerostomia.  So this medication may have a role in his symptoms.  He will discuss this with his Urologist.

## 2024-08-21 NOTE — CONSULT LETTER
[Dear  ___] : Dear  [unfilled], [Please see my note below.] : Please see my note below. [Sincerely,] : Sincerely, [DrHannah  ___] : Dr. RETANA [Consult Letter:] : I had the pleasure of evaluating your patient, [unfilled]. [Consult Closing:] : Thank you very much for allowing me to participate in the care of this patient.  If you have any questions, please do not hesitate to contact me. [FreeTextEntry2] : Gaye Santa DO [FreeTextEntry3] : Gary Manzanares MD, FACS Chief of Otolaryngology at [facility]  Dept. of Otolaryngology [facility]  [DrHannah ___] : Dr. RETANA

## 2024-08-21 NOTE — PROCEDURE
[Unable to Cooperate with Mirror] : patient unable to cooperate with mirror [None] : none [Flexible Endoscope] : examined with the flexible endoscope [Serial Number: ___] : Serial Number: [unfilled] [True Vocal Cords Paralysis] : no true vocal cord paralysis [True Vocal Cords Erythematous] : no true vocal cord edema [True Vocal Cords Green's Nodules] : no true vocal cord nodules [Glottis Arytenoid Cartilages] : no arytenoid granulomas [Glottis Arytenoid Cartilages Erythema] : no arytenoid erythema [Normal] : posterior cricoid area had healthy pink mucosa in the interarytenoid area and the esophageal inlet [de-identified] :  Surgilube

## 2024-09-08 PROBLEM — I25.10 ATHEROSCLEROTIC HEART DISEASE OF NATIVE CORONARY ARTERY WITHOUT ANGINA PECTORIS: Chronic | Status: ACTIVE | Noted: 2024-08-15

## 2024-09-13 ENCOUNTER — OUTPATIENT (OUTPATIENT)
Dept: OUTPATIENT SERVICES | Facility: HOSPITAL | Age: 79
LOS: 1 days | End: 2024-09-13
Payer: MEDICARE

## 2024-09-13 DIAGNOSIS — Z98.890 OTHER SPECIFIED POSTPROCEDURAL STATES: Chronic | ICD-10-CM

## 2024-09-13 DIAGNOSIS — G47.33 OBSTRUCTIVE SLEEP APNEA (ADULT) (PEDIATRIC): ICD-10-CM

## 2024-09-13 DIAGNOSIS — Z96.611 PRESENCE OF RIGHT ARTIFICIAL SHOULDER JOINT: Chronic | ICD-10-CM

## 2024-09-13 PROCEDURE — 95810 POLYSOM 6/> YRS 4/> PARAM: CPT

## 2024-09-13 PROCEDURE — 95810 POLYSOM 6/> YRS 4/> PARAM: CPT | Mod: 26

## 2024-09-23 ENCOUNTER — APPOINTMENT (OUTPATIENT)
Dept: CARDIOLOGY | Facility: CLINIC | Age: 79
End: 2024-09-23
Payer: MEDICARE

## 2024-09-23 PROCEDURE — 93925 LOWER EXTREMITY STUDY: CPT

## 2024-09-30 ENCOUNTER — NON-APPOINTMENT (OUTPATIENT)
Age: 79
End: 2024-09-30

## 2024-09-30 ENCOUNTER — APPOINTMENT (OUTPATIENT)
Dept: CARDIOLOGY | Facility: CLINIC | Age: 79
End: 2024-09-30
Payer: MEDICARE

## 2024-09-30 VITALS
HEART RATE: 71 BPM | OXYGEN SATURATION: 97 % | SYSTOLIC BLOOD PRESSURE: 90 MMHG | RESPIRATION RATE: 16 BRPM | DIASTOLIC BLOOD PRESSURE: 60 MMHG | WEIGHT: 252 LBS | BODY MASS INDEX: 37.21 KG/M2

## 2024-09-30 DIAGNOSIS — I48.91 UNSPECIFIED ATRIAL FIBRILLATION: ICD-10-CM

## 2024-09-30 DIAGNOSIS — I65.29 OCCLUSION AND STENOSIS OF UNSPECIFIED CAROTID ARTERY: ICD-10-CM

## 2024-09-30 DIAGNOSIS — I35.1 NONRHEUMATIC AORTIC (VALVE) INSUFFICIENCY: ICD-10-CM

## 2024-09-30 DIAGNOSIS — R93.1 ABNORMAL FINDINGS ON DIAGNOSTIC IMAGING OF HEART AND CORONARY CIRCULATION: ICD-10-CM

## 2024-09-30 DIAGNOSIS — E66.9 OBESITY, UNSPECIFIED: ICD-10-CM

## 2024-09-30 DIAGNOSIS — I25.10 ATHEROSCLEROTIC HEART DISEASE OF NATIVE CORONARY ARTERY W/OUT ANGINA PECTORIS: ICD-10-CM

## 2024-09-30 DIAGNOSIS — I44.4 LEFT ANTERIOR FASCICULAR BLOCK: ICD-10-CM

## 2024-09-30 PROCEDURE — 99214 OFFICE O/P EST MOD 30 MIN: CPT

## 2024-09-30 PROCEDURE — G2211 COMPLEX E/M VISIT ADD ON: CPT

## 2024-09-30 PROCEDURE — 93000 ELECTROCARDIOGRAM COMPLETE: CPT

## 2024-09-30 NOTE — ASSESSMENT
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 LM no disease 2. LAD 50% mid stenosis 3. LCX luminal irregularities 4. RCA 20% mid stenosis 5. LVEDP 8mmHg  CARDIAC CATH 2013: 50% LAD stenosis, CALCIUM SCORE 100 in 2013   PHARM NUCLEAR STRESS 1/2024: 1. Negative for ischemia/infarct, EF 67% 2. Cannot exclude mild apical lateral wall ischemia 3. Normal BP response, resting 106/78 and peak 92/60 4. Noted to be in AF throughout  PHARM NUCLEAR STRESS 5/2022: 1. Negative for ischemia/infarct, EF 62% 2. Normal HR/BP response  1/17/2024: MCOT reviewed, 79% AF/flutter burden (had both) with average HR 77.   ECHO 2/2024: 1. Sigmoid septum without obstruction, EF 60% 2. Hypo basal inferior wall 3. Normal RV/LA/RA  4. Mild AI   ECHO 8/2023: 1. Sigmoid septum without LVOT obstruction 2. EF 55-60% 3. Normal RV/LA/RA 4. Mild AI  vv ECHO 10/2021: 1. Normal LV size, systolic and diastolic function. EF 55-60%, sigmoid septum. Contrast used 2. Normal RV/LA/RA 3. Mild AI  CAROTID 4/2019: 1. All arteries were clearly visualized. 2. No hemodynamically significant stenosis is noted in the left ICA. 3. No hemodynamically significant stenosis is noted in the right ICA. 4. There is antegrade flow in the right and left vertebral arteries.    AORTIC DUPLEX 2013: Negative for aneurysm.

## 2024-09-30 NOTE — HISTORY OF PRESENT ILLNESS
[FreeTextEntry1] : Patient is a 80yo M with PAF, TIA, non-obstructive CAD, IRAM, AI here for cardiac follow up. Continues to feel dyspnea particularly up stairs. No chest pain, feels short of breath when bends over and notes phlegm in throat frequently. Patient denies PND/orthopnea/edema/palpitations/syncope/claudication. HAd recent echo and CTA chest, also seen by pulmonary. No new symptoms, still some fatigue and mild left sided intermittent chest discomfort  Has 2 year old granddaughter, has total of 10. Has 3 kids and 2 stepsons  ROS: GI and  negative

## 2024-09-30 NOTE — DISCUSSION/SUMMARY
[EKG obtained to assist in diagnosis and management of assessed problem(s)] : EKG obtained to assist in diagnosis and management of assessed problem(s) [FreeTextEntry1] : Patient is a 80yo M with PAF, TIA, non-obstructive CAD, IRAM (not on CPAP anymore), AI here for follow up    WMA: -Echo 2/2024 with normal function and stable mild AI. Basal inferior wall hypo but pharm nuclear stress 1/2024 negative and no RWMA/signs of infarct. Cannot exclude apical lateral wall ischemia but significant artifact and dose not correlate with echo WMA. Repeat echo 7/2024 essentially unchanged with persistent inferior wall hypokinesis.  -CTA chest recently with clear lungs and extensive coronary calcifiations -[facility] 8/2024 with stable mild non obstructive CAD, med management  HLD: -Lipids at goal 1/2024, need rechecked now   PAF: -1/17/2024: MCOT reviewed, 79% AF/flutter burden (had both) with average HR 77. Referred to EP and now s/p ablation 5/2024 .  -s/p ablation with Dr Patricio 5/2024  -Remains in SR today , 2 week MCOT from 8/2024 without recurrent AF   IRAM -Moderate on recent sleep study, follow up pulm for treatment and for PFTS which are pending, saw Dr Amaro  1. Pulm follow up  for continued dyspnea and known IRAM, moderate on recent sleep study.  2. Lipids at goal 1/2024, continue statin and will be getting BW with PMD soon 3. Continue anticoagulation for atrial fibrillation 4.  EP follow up s/p ablation, MCOT summer 2024 without recurrent AF 5. Recommend aggressive diet and lifestyle modifications , counselled on weight loss  6. BP running low, will cut back metoprolol to 12.5mg po bid 7. Follow up 6months

## 2024-10-16 ENCOUNTER — APPOINTMENT (OUTPATIENT)
Dept: ELECTROPHYSIOLOGY | Facility: CLINIC | Age: 79
End: 2024-10-16
Payer: MEDICARE

## 2024-10-16 VITALS
WEIGHT: 256 LBS | BODY MASS INDEX: 37.92 KG/M2 | DIASTOLIC BLOOD PRESSURE: 64 MMHG | HEART RATE: 77 BPM | HEIGHT: 69 IN | SYSTOLIC BLOOD PRESSURE: 99 MMHG

## 2024-10-16 DIAGNOSIS — Z86.79 OTHER SPECIFIED POSTPROCEDURAL STATES: ICD-10-CM

## 2024-10-16 DIAGNOSIS — I48.91 UNSPECIFIED ATRIAL FIBRILLATION: ICD-10-CM

## 2024-10-16 DIAGNOSIS — Z98.890 OTHER SPECIFIED POSTPROCEDURAL STATES: ICD-10-CM

## 2024-10-16 PROCEDURE — 93000 ELECTROCARDIOGRAM COMPLETE: CPT

## 2024-10-16 PROCEDURE — 99214 OFFICE O/P EST MOD 30 MIN: CPT

## 2024-10-18 ENCOUNTER — APPOINTMENT (OUTPATIENT)
Dept: PULMONOLOGY | Facility: CLINIC | Age: 79
End: 2024-10-18
Payer: MEDICARE

## 2024-10-18 VITALS — WEIGHT: 251 LBS | HEIGHT: 69 IN | BODY MASS INDEX: 37.18 KG/M2

## 2024-10-18 DIAGNOSIS — R06.00 DYSPNEA, UNSPECIFIED: ICD-10-CM

## 2024-10-18 PROCEDURE — 94010 BREATHING CAPACITY TEST: CPT

## 2024-10-18 PROCEDURE — 85018 HEMOGLOBIN: CPT | Mod: QW

## 2024-10-18 PROCEDURE — 94729 DIFFUSING CAPACITY: CPT

## 2024-10-18 PROCEDURE — 94727 GAS DIL/WSHOT DETER LNG VOL: CPT

## 2024-10-31 ENCOUNTER — APPOINTMENT (OUTPATIENT)
Dept: PULMONOLOGY | Facility: CLINIC | Age: 79
End: 2024-10-31
Payer: MEDICARE

## 2024-10-31 VITALS
HEART RATE: 72 BPM | OXYGEN SATURATION: 98 % | SYSTOLIC BLOOD PRESSURE: 118 MMHG | WEIGHT: 251 LBS | HEIGHT: 69 IN | BODY MASS INDEX: 37.18 KG/M2 | DIASTOLIC BLOOD PRESSURE: 64 MMHG | RESPIRATION RATE: 16 BRPM

## 2024-10-31 DIAGNOSIS — J84.9 INTERSTITIAL PULMONARY DISEASE, UNSPECIFIED: ICD-10-CM

## 2024-10-31 DIAGNOSIS — E66.9 OBESITY, UNSPECIFIED: ICD-10-CM

## 2024-10-31 DIAGNOSIS — I48.91 UNSPECIFIED ATRIAL FIBRILLATION: ICD-10-CM

## 2024-10-31 DIAGNOSIS — R06.00 DYSPNEA, UNSPECIFIED: ICD-10-CM

## 2024-10-31 DIAGNOSIS — G47.33 OBSTRUCTIVE SLEEP APNEA (ADULT) (PEDIATRIC): ICD-10-CM

## 2024-10-31 PROCEDURE — 99214 OFFICE O/P EST MOD 30 MIN: CPT

## 2025-03-10 ENCOUNTER — APPOINTMENT (OUTPATIENT)
Dept: CARDIOLOGY | Facility: CLINIC | Age: 80
End: 2025-03-10
Payer: MEDICARE

## 2025-03-10 ENCOUNTER — NON-APPOINTMENT (OUTPATIENT)
Age: 80
End: 2025-03-10

## 2025-03-10 VITALS
WEIGHT: 256 LBS | SYSTOLIC BLOOD PRESSURE: 103 MMHG | HEIGHT: 69 IN | HEART RATE: 71 BPM | BODY MASS INDEX: 37.92 KG/M2 | DIASTOLIC BLOOD PRESSURE: 69 MMHG

## 2025-03-10 DIAGNOSIS — Z86.79 OTHER SPECIFIED POSTPROCEDURAL STATES: ICD-10-CM

## 2025-03-10 DIAGNOSIS — I48.91 UNSPECIFIED ATRIAL FIBRILLATION: ICD-10-CM

## 2025-03-10 DIAGNOSIS — I44.4 LEFT ANTERIOR FASCICULAR BLOCK: ICD-10-CM

## 2025-03-10 DIAGNOSIS — I10 ESSENTIAL (PRIMARY) HYPERTENSION: ICD-10-CM

## 2025-03-10 DIAGNOSIS — I25.10 ATHEROSCLEROTIC HEART DISEASE OF NATIVE CORONARY ARTERY W/OUT ANGINA PECTORIS: ICD-10-CM

## 2025-03-10 DIAGNOSIS — E66.9 OBESITY, UNSPECIFIED: ICD-10-CM

## 2025-03-10 DIAGNOSIS — Z98.890 OTHER SPECIFIED POSTPROCEDURAL STATES: ICD-10-CM

## 2025-03-10 PROCEDURE — 93000 ELECTROCARDIOGRAM COMPLETE: CPT

## 2025-03-10 PROCEDURE — 99214 OFFICE O/P EST MOD 30 MIN: CPT

## 2025-03-10 PROCEDURE — G2211 COMPLEX E/M VISIT ADD ON: CPT

## 2025-03-14 ENCOUNTER — APPOINTMENT (OUTPATIENT)
Dept: PULMONOLOGY | Facility: CLINIC | Age: 80
End: 2025-03-14

## 2025-04-23 ENCOUNTER — APPOINTMENT (OUTPATIENT)
Dept: ELECTROPHYSIOLOGY | Facility: CLINIC | Age: 80
End: 2025-04-23
Payer: MEDICARE

## 2025-04-23 VITALS
DIASTOLIC BLOOD PRESSURE: 67 MMHG | HEIGHT: 69 IN | WEIGHT: 259 LBS | HEART RATE: 72 BPM | OXYGEN SATURATION: 97 % | BODY MASS INDEX: 38.36 KG/M2 | SYSTOLIC BLOOD PRESSURE: 104 MMHG

## 2025-04-23 DIAGNOSIS — I48.91 UNSPECIFIED ATRIAL FIBRILLATION: ICD-10-CM

## 2025-04-23 PROCEDURE — 93000 ELECTROCARDIOGRAM COMPLETE: CPT

## 2025-04-23 PROCEDURE — 99214 OFFICE O/P EST MOD 30 MIN: CPT

## 2025-05-19 ENCOUNTER — NON-APPOINTMENT (OUTPATIENT)
Age: 80
End: 2025-05-19

## 2025-05-29 ENCOUNTER — RX RENEWAL (OUTPATIENT)
Age: 80
End: 2025-05-29

## 2025-08-11 ENCOUNTER — APPOINTMENT (OUTPATIENT)
Dept: CARDIOLOGY | Facility: CLINIC | Age: 80
End: 2025-08-11
Payer: MEDICARE

## 2025-08-11 VITALS
RESPIRATION RATE: 16 BRPM | HEART RATE: 75 BPM | WEIGHT: 259 LBS | SYSTOLIC BLOOD PRESSURE: 88 MMHG | BODY MASS INDEX: 38.36 KG/M2 | DIASTOLIC BLOOD PRESSURE: 60 MMHG | HEIGHT: 69 IN

## 2025-08-11 DIAGNOSIS — I25.10 ATHEROSCLEROTIC HEART DISEASE OF NATIVE CORONARY ARTERY W/OUT ANGINA PECTORIS: ICD-10-CM

## 2025-08-11 DIAGNOSIS — I65.29 OCCLUSION AND STENOSIS OF UNSPECIFIED CAROTID ARTERY: ICD-10-CM

## 2025-08-11 DIAGNOSIS — R06.00 DYSPNEA, UNSPECIFIED: ICD-10-CM

## 2025-08-11 DIAGNOSIS — Z98.890 OTHER SPECIFIED POSTPROCEDURAL STATES: ICD-10-CM

## 2025-08-11 DIAGNOSIS — Z86.79 OTHER SPECIFIED POSTPROCEDURAL STATES: ICD-10-CM

## 2025-08-11 DIAGNOSIS — I48.91 UNSPECIFIED ATRIAL FIBRILLATION: ICD-10-CM

## 2025-08-11 DIAGNOSIS — I35.1 NONRHEUMATIC AORTIC (VALVE) INSUFFICIENCY: ICD-10-CM

## 2025-08-11 DIAGNOSIS — I44.4 LEFT ANTERIOR FASCICULAR BLOCK: ICD-10-CM

## 2025-08-11 PROCEDURE — G2211 COMPLEX E/M VISIT ADD ON: CPT

## 2025-08-11 PROCEDURE — 93000 ELECTROCARDIOGRAM COMPLETE: CPT

## 2025-08-11 PROCEDURE — 99214 OFFICE O/P EST MOD 30 MIN: CPT

## 2025-08-11 RX ORDER — METOPROLOL SUCCINATE 25 MG/1
25 TABLET, EXTENDED RELEASE ORAL DAILY
Qty: 90 | Refills: 2 | Status: ACTIVE | COMMUNITY
Start: 2025-08-11 | End: 1900-01-01

## 2025-08-18 ENCOUNTER — RX RENEWAL (OUTPATIENT)
Age: 80
End: 2025-08-18

## 2025-08-22 ENCOUNTER — APPOINTMENT (OUTPATIENT)
Dept: CARDIOLOGY | Facility: CLINIC | Age: 80
End: 2025-08-22
Payer: MEDICARE

## 2025-08-22 PROCEDURE — 93306 TTE W/DOPPLER COMPLETE: CPT

## 2025-08-22 RX ORDER — PERFLUTREN 6.52 MG/ML
6.52 INJECTION, SUSPENSION INTRAVENOUS
Qty: 2 | Refills: 0 | Status: COMPLETED | OUTPATIENT
Start: 2025-08-22

## 2025-09-08 ENCOUNTER — APPOINTMENT (OUTPATIENT)
Dept: CARDIOLOGY | Facility: CLINIC | Age: 80
End: 2025-09-08